# Patient Record
Sex: MALE | Race: BLACK OR AFRICAN AMERICAN | NOT HISPANIC OR LATINO | URBAN - METROPOLITAN AREA
[De-identification: names, ages, dates, MRNs, and addresses within clinical notes are randomized per-mention and may not be internally consistent; named-entity substitution may affect disease eponyms.]

---

## 2019-05-03 ENCOUNTER — INPATIENT (INPATIENT)
Facility: HOSPITAL | Age: 24
LOS: 27 days | Discharge: HOME | End: 2019-05-31
Attending: PSYCHIATRY & NEUROLOGY | Admitting: PSYCHIATRY & NEUROLOGY
Payer: COMMERCIAL

## 2019-05-03 VITALS
RESPIRATION RATE: 16 BRPM | SYSTOLIC BLOOD PRESSURE: 120 MMHG | HEART RATE: 93 BPM | DIASTOLIC BLOOD PRESSURE: 74 MMHG | TEMPERATURE: 98 F | OXYGEN SATURATION: 100 %

## 2019-05-03 DIAGNOSIS — F31.9 BIPOLAR DISORDER, UNSPECIFIED: ICD-10-CM

## 2019-05-03 DIAGNOSIS — F33.2 MAJOR DEPRESSIVE DISORDER, RECURRENT SEVERE WITHOUT PSYCHOTIC FEATURES: ICD-10-CM

## 2019-05-03 DIAGNOSIS — F25.9 SCHIZOAFFECTIVE DISORDER, UNSPECIFIED: ICD-10-CM

## 2019-05-03 LAB
ALBUMIN SERPL ELPH-MCNC: 4.9 G/DL — SIGNIFICANT CHANGE UP (ref 3.3–5)
ALP SERPL-CCNC: 97 U/L — SIGNIFICANT CHANGE UP (ref 40–120)
ALT FLD-CCNC: 14 U/L — SIGNIFICANT CHANGE UP (ref 4–41)
AMPHET UR-MCNC: NEGATIVE — SIGNIFICANT CHANGE UP
ANION GAP SERPL CALC-SCNC: 11 MMO/L — SIGNIFICANT CHANGE UP (ref 7–14)
APAP SERPL-MCNC: < 15 UG/ML — LOW (ref 15–25)
APPEARANCE UR: CLEAR — SIGNIFICANT CHANGE UP
AST SERPL-CCNC: 27 U/L — SIGNIFICANT CHANGE UP (ref 4–40)
BARBITURATES UR SCN-MCNC: NEGATIVE — SIGNIFICANT CHANGE UP
BASOPHILS # BLD AUTO: 0.04 K/UL — SIGNIFICANT CHANGE UP (ref 0–0.2)
BASOPHILS NFR BLD AUTO: 0.4 % — SIGNIFICANT CHANGE UP (ref 0–2)
BENZODIAZ UR-MCNC: NEGATIVE — SIGNIFICANT CHANGE UP
BILIRUB SERPL-MCNC: 0.5 MG/DL — SIGNIFICANT CHANGE UP (ref 0.2–1.2)
BILIRUB UR-MCNC: NEGATIVE — SIGNIFICANT CHANGE UP
BLOOD UR QL VISUAL: NEGATIVE — SIGNIFICANT CHANGE UP
BUN SERPL-MCNC: 20 MG/DL — SIGNIFICANT CHANGE UP (ref 7–23)
CALCIUM SERPL-MCNC: 9.8 MG/DL — SIGNIFICANT CHANGE UP (ref 8.4–10.5)
CANNABINOIDS UR-MCNC: NEGATIVE — SIGNIFICANT CHANGE UP
CHLORIDE SERPL-SCNC: 98 MMOL/L — SIGNIFICANT CHANGE UP (ref 98–107)
CO2 SERPL-SCNC: 28 MMOL/L — SIGNIFICANT CHANGE UP (ref 22–31)
COCAINE METAB.OTHER UR-MCNC: NEGATIVE — SIGNIFICANT CHANGE UP
COLOR SPEC: SIGNIFICANT CHANGE UP
CREAT SERPL-MCNC: 0.99 MG/DL — SIGNIFICANT CHANGE UP (ref 0.5–1.3)
EOSINOPHIL # BLD AUTO: 0.26 K/UL — SIGNIFICANT CHANGE UP (ref 0–0.5)
EOSINOPHIL NFR BLD AUTO: 2.9 % — SIGNIFICANT CHANGE UP (ref 0–6)
ETHANOL BLD-MCNC: < 10 MG/DL — SIGNIFICANT CHANGE UP
GLUCOSE SERPL-MCNC: 89 MG/DL — SIGNIFICANT CHANGE UP (ref 70–99)
GLUCOSE UR-MCNC: NEGATIVE — SIGNIFICANT CHANGE UP
HCT VFR BLD CALC: 48.2 % — SIGNIFICANT CHANGE UP (ref 39–50)
HGB BLD-MCNC: 15.7 G/DL — SIGNIFICANT CHANGE UP (ref 13–17)
HIV COMBO RESULT: SIGNIFICANT CHANGE UP
HIV1+2 AB SPEC QL: SIGNIFICANT CHANGE UP
IMM GRANULOCYTES NFR BLD AUTO: 0.3 % — SIGNIFICANT CHANGE UP (ref 0–1.5)
KETONES UR-MCNC: NEGATIVE — SIGNIFICANT CHANGE UP
LEUKOCYTE ESTERASE UR-ACNC: NEGATIVE — SIGNIFICANT CHANGE UP
LYMPHOCYTES # BLD AUTO: 2.13 K/UL — SIGNIFICANT CHANGE UP (ref 1–3.3)
LYMPHOCYTES # BLD AUTO: 23.7 % — SIGNIFICANT CHANGE UP (ref 13–44)
MAGNESIUM SERPL-MCNC: 2 MG/DL — SIGNIFICANT CHANGE UP (ref 1.6–2.6)
MCHC RBC-ENTMCNC: 30.3 PG — SIGNIFICANT CHANGE UP (ref 27–34)
MCHC RBC-ENTMCNC: 32.6 % — SIGNIFICANT CHANGE UP (ref 32–36)
MCV RBC AUTO: 92.9 FL — SIGNIFICANT CHANGE UP (ref 80–100)
METHADONE UR-MCNC: NEGATIVE — SIGNIFICANT CHANGE UP
MONOCYTES # BLD AUTO: 1.27 K/UL — HIGH (ref 0–0.9)
MONOCYTES NFR BLD AUTO: 14.1 % — HIGH (ref 2–14)
NEUTROPHILS # BLD AUTO: 5.27 K/UL — SIGNIFICANT CHANGE UP (ref 1.8–7.4)
NEUTROPHILS NFR BLD AUTO: 58.6 % — SIGNIFICANT CHANGE UP (ref 43–77)
NITRITE UR-MCNC: NEGATIVE — SIGNIFICANT CHANGE UP
NRBC # FLD: 0 K/UL — SIGNIFICANT CHANGE UP (ref 0–0)
OPIATES UR-MCNC: NEGATIVE — SIGNIFICANT CHANGE UP
OXYCODONE UR-MCNC: NEGATIVE — SIGNIFICANT CHANGE UP
PCP UR-MCNC: NEGATIVE — SIGNIFICANT CHANGE UP
PH UR: 6.5 — SIGNIFICANT CHANGE UP (ref 5–8)
PLATELET # BLD AUTO: 222 K/UL — SIGNIFICANT CHANGE UP (ref 150–400)
PMV BLD: 10 FL — SIGNIFICANT CHANGE UP (ref 7–13)
POTASSIUM SERPL-MCNC: 4.2 MMOL/L — SIGNIFICANT CHANGE UP (ref 3.5–5.3)
POTASSIUM SERPL-SCNC: 4.2 MMOL/L — SIGNIFICANT CHANGE UP (ref 3.5–5.3)
PROT SERPL-MCNC: 8.4 G/DL — HIGH (ref 6–8.3)
PROT UR-MCNC: NEGATIVE — SIGNIFICANT CHANGE UP
RBC # BLD: 5.19 M/UL — SIGNIFICANT CHANGE UP (ref 4.2–5.8)
RBC # FLD: 13.9 % — SIGNIFICANT CHANGE UP (ref 10.3–14.5)
SALICYLATES SERPL-MCNC: < 5 MG/DL — LOW (ref 15–30)
SODIUM SERPL-SCNC: 137 MMOL/L — SIGNIFICANT CHANGE UP (ref 135–145)
SP GR SPEC: 1.02 — SIGNIFICANT CHANGE UP (ref 1–1.04)
TSH SERPL-MCNC: 1.58 UIU/ML — SIGNIFICANT CHANGE UP (ref 0.27–4.2)
UROBILINOGEN FLD QL: NORMAL — SIGNIFICANT CHANGE UP
WBC # BLD: 9 K/UL — SIGNIFICANT CHANGE UP (ref 3.8–10.5)
WBC # FLD AUTO: 9 K/UL — SIGNIFICANT CHANGE UP (ref 3.8–10.5)

## 2019-05-03 PROCEDURE — 99285 EMERGENCY DEPT VISIT HI MDM: CPT | Mod: GC

## 2019-05-03 RX ORDER — QUETIAPINE FUMARATE 200 MG/1
200 TABLET, FILM COATED ORAL AT BEDTIME
Qty: 0 | Refills: 0 | Status: DISCONTINUED | OUTPATIENT
Start: 2019-05-03 | End: 2019-05-03

## 2019-05-03 RX ORDER — DIPHENHYDRAMINE HCL 50 MG
50 CAPSULE ORAL ONCE
Qty: 0 | Refills: 0 | Status: COMPLETED | OUTPATIENT
Start: 2019-05-03 | End: 2019-05-03

## 2019-05-03 RX ORDER — DIVALPROEX SODIUM 500 MG/1
2000 TABLET, DELAYED RELEASE ORAL AT BEDTIME
Qty: 0 | Refills: 0 | Status: DISCONTINUED | OUTPATIENT
Start: 2019-05-03 | End: 2019-05-03

## 2019-05-03 RX ORDER — QUETIAPINE FUMARATE 200 MG/1
25 TABLET, FILM COATED ORAL EVERY 6 HOURS
Qty: 0 | Refills: 0 | Status: DISCONTINUED | OUTPATIENT
Start: 2019-05-03 | End: 2019-05-06

## 2019-05-03 RX ORDER — DIVALPROEX SODIUM 500 MG/1
1500 TABLET, DELAYED RELEASE ORAL
Qty: 0 | Refills: 0 | Status: DISCONTINUED | OUTPATIENT
Start: 2019-05-03 | End: 2019-05-31

## 2019-05-03 RX ORDER — QUETIAPINE FUMARATE 200 MG/1
250 TABLET, FILM COATED ORAL AT BEDTIME
Qty: 0 | Refills: 0 | Status: DISCONTINUED | OUTPATIENT
Start: 2019-05-03 | End: 2019-05-06

## 2019-05-03 RX ORDER — DIVALPROEX SODIUM 500 MG/1
1500 TABLET, DELAYED RELEASE ORAL DAILY
Qty: 0 | Refills: 0 | Status: DISCONTINUED | OUTPATIENT
Start: 2019-05-03 | End: 2019-05-03

## 2019-05-03 RX ADMIN — DIVALPROEX SODIUM 1500 MILLIGRAM(S): 500 TABLET, DELAYED RELEASE ORAL at 21:55

## 2019-05-03 RX ADMIN — Medication 2 MILLIGRAM(S): at 12:26

## 2019-05-03 RX ADMIN — QUETIAPINE FUMARATE 25 MILLIGRAM(S): 200 TABLET, FILM COATED ORAL at 18:13

## 2019-05-03 RX ADMIN — Medication 2 MILLIGRAM(S): at 18:13

## 2019-05-03 RX ADMIN — QUETIAPINE FUMARATE 250 MILLIGRAM(S): 200 TABLET, FILM COATED ORAL at 21:55

## 2019-05-03 RX ADMIN — Medication 50 MILLIGRAM(S): at 12:26

## 2019-05-03 RX ADMIN — DIVALPROEX SODIUM 1500 MILLIGRAM(S): 500 TABLET, DELAYED RELEASE ORAL at 15:42

## 2019-05-03 NOTE — ED BEHAVIORAL HEALTH ASSESSMENT NOTE - DETAILS
[facility] n/a unable to contact patient's family contacted PHP provider above reports "allergies" to haldol, zyprexa, and prolixin - dystonic reaction/EPS reports "allergies" to haldol, zyprexa, and prolixin - dystonic reaction/EPS, Risperdal - concern for NMS, zyprexa - elevated CK/concern for NMS handoff to Dr. Mendelowitz 2N

## 2019-05-03 NOTE — ED BEHAVIORAL HEALTH ASSESSMENT NOTE - PSYCHIATRIC ISSUES AND PLAN (INCLUDE STANDING AND PRN MEDICATION)
Continue depakote 1500 qAM + 2000 qhs, seroquel 200mg qhs. Consider need for further med titration and would favor medication available in OLIVIA for compliance concerns. PRN seroquel/ativan PO, PRN ativan IM for agitation.

## 2019-05-03 NOTE — ED BEHAVIORAL HEALTH ASSESSMENT NOTE - SUMMARY
Patient is a 24yo [place]-American man, single, unemployed, noncaregiver, domiciled with step-father who is currently out of the country, with PPHx of documented Schizoaffective, bipolar type, at least 4 past hospitalizations last at [facility] from March 30 - April 30 discharged to [facility] for the last few days on Depakote and Seroquel, no known hx of suicidality or violence, although expresses rage/aggression towards family members, denies active substance use, no relevant PMHx who presents to the ED BIBA activated by his cousins after being up all night and appearing restless and manic.     Patient presents as manic with elevated mood, decreased need for sleep, increased energy, distractibility, increased goal directed activity, grandiosity, flight of ideas, and pressured speech. Patient was recently discharged from hospital and has been in partial program for the last few days but has presented very unstable and concern that he may have been non-compliant with medications. Although patient denies SI/HI/I/P, he has expressed recent rage and threats of violence towards his uncle in the context of his manic episode and has been not sleeping and not eating. He currently represents a danger to himself and others and requires inpatient psychiatric hospitalization for further stabilization and safety for ongoing symptoms of partially treated manic episode. Patient is a 22yo Northern Irish-American man, single, unemployed, noncaregiver, domiciled with step-father who is currently out of the country, with PPHx of documented Schizoaffective, bipolar type, at least 4 past hospitalizations last at [facility] from March 30 - April 30 discharged to [facility] for the last few days on Depakote and Seroquel, no known hx of suicidality or violence, although expresses rage/aggression towards family members, denies active substance use, no relevant PMHx who presents to the ED BIBA activated by his cousins after being up all night and appearing restless and manic.     Patient presents as manic with elevated mood, decreased need for sleep, increased energy, distractibility, increased goal directed activity, grandiosity, flight of ideas, and pressured speech. Patient was recently discharged from hospital and has been in partial program for the last few days but has presented very unstable and concern that he may have been non-compliant with medications. Although patient denies SI/HI/I/P, he has expressed recent rage and threats of violence towards his uncle in the context of his manic episode and has been not sleeping and not eating. He currently represents a danger to himself and others and requires inpatient psychiatric hospitalization for further stabilization and safety for ongoing symptoms of partially treated manic episode. Patient has had dystonic reactions and concern for NMS with multiple past antipsychotics (see note), but would likely benefit from OLIVIA if able to tolerate.

## 2019-05-03 NOTE — ED PROVIDER NOTE - PHYSICAL EXAMINATION
PHYSICAL EXAM:  Vital signs reviewed.  GENERAL: Patient is awake and alert and in no acute distress.  Non-toxic appearing.  A+Ox4  HEAD:  Airway patent.  No oropharyngeal edema.  No stridor.  Auricles are normal.    EYES: EOM grossly intact, conjunctiva non-injected and sclera clear  NECK: Supple, No vertebral point tenderness to palpation.  CHEST/LUNG: Lungs clear to auscultation bilaterally; no wheeze, no rhonchi,  no rales.    HEART: Regular rate and rhythm;   ABDOMEN: Soft, non-tender to palpation.  No rebound/no guarding.  Bowel sounds present x 4.   MSK/EXTREMITIES: No clubbing or cyanosis. Back is nontender, with no vertebral point tenderness to palpation, no CVAT.  Moving all 4 extremities.     NEURO: Neurologically grossly intact.   No obvious deficits.   PSYCH: Psychiatrically normal mood and affect.  No apparent risk to self or others.       DR. HERNANDEZ, ATTENDING MD:    I performed a face to face bedside interview with patient regarding history of present illness, review of symptoms and past medical history. I completed an independent physical exam.  I have discussed patient's plan of care with the team of health care providers.   I agree with note as stated above, having amended the EMR as needed to reflect my findings. I have discussed the assessment and plan of care.  This includes during the time I functioned as the attending physician for this patient.

## 2019-05-03 NOTE — ED ADULT NURSE REASSESSMENT NOTE - NS ED NURSE REASSESS COMMENT FT1
After finding out he was being admitted pt became agitated, yelling, cursing, deescalation attempted unsuccessfully, pt medicated as per orders.

## 2019-05-03 NOTE — ED BEHAVIORAL HEALTH ASSESSMENT NOTE - OTHER PAST PSYCHIATRIC HISTORY (INCLUDE DETAILS REGARDING ONSET, COURSE OF ILLNESS, INPATIENT/OUTPATIENT TREATMENT)
At least 4 prior hospitalizations  first at [facility] 2014  last at [facility] 3/2019-4/2019

## 2019-05-03 NOTE — ED PROVIDER NOTE - CARE PLAN
Principal Discharge DX:	Schizoaffective disorder  Secondary Diagnosis:	Bipolar affective disorder, current episode manic, current episode severity unspecified

## 2019-05-03 NOTE — ED PROVIDER NOTE - CLINICAL SUMMARY MEDICAL DECISION MAKING FREE TEXT BOX
Patient triaged directly to behavioral health area for psychiatric evaluation. Labs, PO hydration, reassess.

## 2019-05-03 NOTE — ED BEHAVIORAL HEALTH ASSESSMENT NOTE - CASE SUMMARY
Patient is a 24yo Latvian-American man, single, unemployed, noncaregiver, domiciled with step-father who is currently out of the country, with PPHx of documented Schizoaffective, bipolar type, at least 4 past hospitalizations last at [facility] from March 30 - April 30 discharged to [facility] for the last few days on Depakote and Seroquel, no known hx of suicidality or violence, although expresses rage/aggression towards family members, denies active substance use, no relevant PMHx who presents to the ED BIBA activated by his cousins after being up all night and appearing restless and manic.     Patient presents as manic with elevated mood, decreased need for sleep, increased energy, distractibility, increased goal directed activity, grandiosity, flight of ideas, and pressured speech. Patient was recently discharged from hospital and has been in partial program for the last few days but has presented very unstable and concern that he may have been non-compliant with medications. Although patient denies SI/HI/I/P, he has expressed recent rage and threats of violence towards his uncle in the context of his manic episode and has been not sleeping and not eating. He currently represents a danger to himself and others and requires inpatient psychiatric hospitalization for further stabilization and safety for ongoing symptoms of partially treated manic episode.

## 2019-05-03 NOTE — ED BEHAVIORAL HEALTH ASSESSMENT NOTE - RISK ASSESSMENT
Patient is at elevated risk of harm to self and others. Chronic risk factors include diagnosis of schizoaffective, bipolar, hx of prior hospitalizations, hx of quick decompensation, male gender. Acute risk factors include current manic symptoms, global insomnia, threats of violence towards family, little insight into need for additional treatment. Patient has decompensated despite high level of care at partial hospital program and requires inpatient psychiatric hospitalization for further stabilization and safety for ongoing symptoms of partially treated manic episode.

## 2019-05-03 NOTE — ED ADULT TRIAGE NOTE - CHIEF COMPLAINT QUOTE
Arrives with ems from home, h/o bipolar disorder and schizophrenia , family called ems because he was restless and acting out. Denies any drugs or etoh use.

## 2019-05-03 NOTE — ED BEHAVIORAL HEALTH ASSESSMENT NOTE - DESCRIPTION
cooperative with interview, pacing and restless at times    Vital Signs Last 24 Hrs  T(C): 36.9 (03 May 2019 08:54), Max: 36.9 (03 May 2019 08:54)  T(F): 98.5 (03 May 2019 08:54), Max: 98.5 (03 May 2019 08:54)  HR: 93 (03 May 2019 08:54) (93 - 93)  BP: 120/74 (03 May 2019 08:54) (120/74 - 120/74)  BP(mean): --  RR: 16 (03 May 2019 08:54) (16 - 16)  SpO2: 100% (03 May 2019 08:54) (100% - 100%) none see HPI

## 2019-05-03 NOTE — ED BEHAVIORAL HEALTH ASSESSMENT NOTE - HPI (INCLUDE ILLNESS QUALITY, SEVERITY, DURATION, TIMING, CONTEXT, MODIFYING FACTORS, ASSOCIATED SIGNS AND SYMPTOMS)
Patient is a 22yo Cuban-American man, single, unemployed, noncaregiver, domiciled with step-father who is currently out of the country, with PPHx of documented Schizoaffective, bipolar type, at least 4 past hospitalizations last at [facility] from March 30 - April 30 discharged to [facility] Program for the last few days on Depakote and Seroquel, no known hx of suicidality or violence, although expresses rage/aggression towards family members, denies active substance use, no relevant PMHx who presents to the ED BIBA activated by his cousins after being up all night and appearing restless and manic.     On evaluation, patient is AOx3, tangential with flight of ideas, pressured speech, difficulty sitting still during interview, choosing to stand intermittently. Patient is difficult to follow, requiring frequent interruptions and redirection to establish events leading up to presentation to the ED and gather symptoms. Per patient, he is currently living in [place] with his step-father who is out of town, and he is being looked after by his brother and uncle. 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 He states that in the middle of the night, he asked his brother for his passport and he refused to give to him, so patient went to the police station to demand his identification. He reports that police contacted his brother and released him with plan for him to go to [facility] that day, instead patient took the subway to his cousin's house and in the early morning hours demanded to speak with his cousin who is a  in order to continue to pursue this new project. Per patient, his cousin's felt that he appeared manic and having a "mental breakdown" and called 911.     Patient reports mood is "great", endorses not sleeping since leaving the hospital, exhibits pressured speech, flight of ideas, increased goal directed activity - reports spending 10hr per day working on new projects as a "", endorses being easily distracted by ambient noises and conversations outside the room, displays grandiosity with respect to his abilities as a  and his many projects/abilities to network considering that he is in fact unemployed, denies behaviors with adverse consequences. Denies AVH, paranoia, or delusions. Denies SI/HI/I/P, denies being aggressive or violent with family members on this occasion. Reports an extensive and elaborate organizational system to keep track of various things, endorses being fixated on order and has difficulty when his home is in disarray, denies OCD symptoms. Denies hx of psychotic symptoms, but per documentation carries schizoaffective diagnosis. Reports recent changes to medications, but does not know what meds he takes other than depakote. Reports compliance with all meds.     Collateral obtained from patient's primary therapist Joanne Lehman at [facility] reports that patient has appeared very manic, grandiose and unstable since discharge from the hospital and that the staff has been worried about him, not surprised that he is in the ED. Reports that patient has also been expressing rage and threats towards his uncle. They are suspicious that patient may be noncompliant with meds although he denies. They report that patient is well known to [facility] and [facility] and that at baseline patient has hypomanic symptoms. Provided contact information for step-dad who is out of the country and was not available and patient's Health Home Coordinator Agnieszka [phone]. Confirmed that patient is on depakote 1500mg qAM + 2000mg qhs and Seroquel 200mg qhs.

## 2019-05-03 NOTE — ED PROVIDER NOTE - PROGRESS NOTE DETAILS
JULI JONES MD: D/W psychiatry and awaiting medical clearance for admission. JULI JONES, MD: Attending performed HPI, past medical history, Social hx, ROS, PE and MDM.  Residents, PAs, and/or NPs  may be involved in case for assistance with disposition or document as necessary via progress note(s).

## 2019-05-03 NOTE — ED BEHAVIORAL HEALTH ASSESSMENT NOTE - ACCOMPANIED BY
Note Text (......Xxx Chief Complaint.): This diagnosis correlates with the Detail Level: Detailed Other (Free Text): Patient to consider on having biopsy in the future. Self

## 2019-05-03 NOTE — ED ADULT NURSE NOTE - OBJECTIVE STATEMENT
Pt received a&ox3, as per EMS pt brought in after family called stating pt was "restless", pt w recent psychiatric hospitalization at [facility] for 1 month, pt w c/o "racing thoughts", endorses difficulty sleeping since prior to hospitalization, pt denies any SI/HI/Drug use/Drinking/Hallucinations/Delusions, pt calm and cooperative, NAD, will continue to monitor.

## 2019-05-03 NOTE — ED BEHAVIORAL HEALTH ASSESSMENT NOTE - PAST PSYCHOTROPIC MEDICATION
haldol, zyprexa, prolixin Risperdal - concern for NMS, Haldol, zyprexa - elevated CK/concern for NMS  thorazine - elevated CK/concern for NMS, Lithium, Clozapine - only able to titrate to 50mg TID due to tachycardia, prolixin

## 2019-05-03 NOTE — ED PROVIDER NOTE - OBJECTIVE STATEMENT
24 yo F brought in by EMS after family called secondary to agitation, insomnia and decreased eating and drinking.  Patient states he's had difficulty sleeping and decreased appetite.  Patient states past medical history of bipolar and schizoaffective d/o.

## 2019-05-04 LAB
BACTERIA UR CULT: SIGNIFICANT CHANGE UP
CHOLEST SERPL-MCNC: 142 MG/DL — SIGNIFICANT CHANGE UP (ref 120–199)
HBA1C BLD-MCNC: 5.4 % — SIGNIFICANT CHANGE UP (ref 4–5.6)
HDLC SERPL-MCNC: 54 MG/DL — SIGNIFICANT CHANGE UP (ref 35–55)
LIPID PNL WITH DIRECT LDL SERPL: 84 MG/DL — SIGNIFICANT CHANGE UP
SPECIMEN SOURCE: SIGNIFICANT CHANGE UP
TRIGL SERPL-MCNC: 52 MG/DL — SIGNIFICANT CHANGE UP (ref 10–149)
VALPROATE SERPL-MCNC: 96.9 UG/ML — SIGNIFICANT CHANGE UP (ref 50–100)

## 2019-05-04 PROCEDURE — 99222 1ST HOSP IP/OBS MODERATE 55: CPT

## 2019-05-04 RX ADMIN — Medication 2 MILLIGRAM(S): at 08:09

## 2019-05-04 RX ADMIN — DIVALPROEX SODIUM 1500 MILLIGRAM(S): 500 TABLET, DELAYED RELEASE ORAL at 21:11

## 2019-05-04 RX ADMIN — QUETIAPINE FUMARATE 250 MILLIGRAM(S): 200 TABLET, FILM COATED ORAL at 21:11

## 2019-05-04 RX ADMIN — QUETIAPINE FUMARATE 25 MILLIGRAM(S): 200 TABLET, FILM COATED ORAL at 08:07

## 2019-05-04 RX ADMIN — DIVALPROEX SODIUM 1500 MILLIGRAM(S): 500 TABLET, DELAYED RELEASE ORAL at 08:07

## 2019-05-05 PROCEDURE — 99232 SBSQ HOSP IP/OBS MODERATE 35: CPT

## 2019-05-05 RX ADMIN — QUETIAPINE FUMARATE 25 MILLIGRAM(S): 200 TABLET, FILM COATED ORAL at 07:20

## 2019-05-05 RX ADMIN — Medication 2 MILLIGRAM(S): at 07:20

## 2019-05-05 RX ADMIN — DIVALPROEX SODIUM 1500 MILLIGRAM(S): 500 TABLET, DELAYED RELEASE ORAL at 20:05

## 2019-05-05 RX ADMIN — DIVALPROEX SODIUM 1500 MILLIGRAM(S): 500 TABLET, DELAYED RELEASE ORAL at 09:35

## 2019-05-05 RX ADMIN — QUETIAPINE FUMARATE 250 MILLIGRAM(S): 200 TABLET, FILM COATED ORAL at 20:05

## 2019-05-06 PROCEDURE — 99232 SBSQ HOSP IP/OBS MODERATE 35: CPT

## 2019-05-06 RX ORDER — DIPHENHYDRAMINE HCL 50 MG
50 CAPSULE ORAL ONCE
Qty: 0 | Refills: 0 | Status: COMPLETED | OUTPATIENT
Start: 2019-05-06 | End: 2019-05-06

## 2019-05-06 RX ORDER — OLANZAPINE 15 MG/1
10 TABLET, FILM COATED ORAL ONCE
Qty: 0 | Refills: 0 | Status: DISCONTINUED | OUTPATIENT
Start: 2019-05-06 | End: 2019-05-07

## 2019-05-06 RX ORDER — OLANZAPINE 15 MG/1
10 TABLET, FILM COATED ORAL EVERY 6 HOURS
Qty: 0 | Refills: 0 | Status: DISCONTINUED | OUTPATIENT
Start: 2019-05-06 | End: 2019-05-07

## 2019-05-06 RX ORDER — DIPHENHYDRAMINE HCL 50 MG
50 CAPSULE ORAL ONCE
Qty: 0 | Refills: 0 | Status: DISCONTINUED | OUTPATIENT
Start: 2019-05-06 | End: 2019-05-06

## 2019-05-06 RX ORDER — OLANZAPINE 15 MG/1
10 TABLET, FILM COATED ORAL AT BEDTIME
Qty: 0 | Refills: 0 | Status: DISCONTINUED | OUTPATIENT
Start: 2019-05-06 | End: 2019-05-07

## 2019-05-06 RX ORDER — HALOPERIDOL DECANOATE 100 MG/ML
7.5 INJECTION INTRAMUSCULAR ONCE
Qty: 0 | Refills: 0 | Status: COMPLETED | OUTPATIENT
Start: 2019-05-06 | End: 2019-05-06

## 2019-05-06 RX ORDER — HALOPERIDOL DECANOATE 100 MG/ML
7.5 INJECTION INTRAMUSCULAR ONCE
Qty: 0 | Refills: 0 | Status: DISCONTINUED | OUTPATIENT
Start: 2019-05-06 | End: 2019-05-06

## 2019-05-06 RX ADMIN — HALOPERIDOL DECANOATE 7.5 MILLIGRAM(S): 100 INJECTION INTRAMUSCULAR at 16:08

## 2019-05-06 RX ADMIN — DIVALPROEX SODIUM 1500 MILLIGRAM(S): 500 TABLET, DELAYED RELEASE ORAL at 21:21

## 2019-05-06 RX ADMIN — QUETIAPINE FUMARATE 25 MILLIGRAM(S): 200 TABLET, FILM COATED ORAL at 07:12

## 2019-05-06 RX ADMIN — Medication 50 MILLIGRAM(S): at 16:08

## 2019-05-06 RX ADMIN — Medication 2 MILLIGRAM(S): at 07:12

## 2019-05-06 RX ADMIN — DIVALPROEX SODIUM 1500 MILLIGRAM(S): 500 TABLET, DELAYED RELEASE ORAL at 08:53

## 2019-05-06 RX ADMIN — OLANZAPINE 10 MILLIGRAM(S): 15 TABLET, FILM COATED ORAL at 21:21

## 2019-05-06 RX ADMIN — Medication 3 MILLIGRAM(S): at 16:08

## 2019-05-07 LAB — CK SERPL-CCNC: 3054 U/L — HIGH (ref 30–200)

## 2019-05-07 PROCEDURE — 99232 SBSQ HOSP IP/OBS MODERATE 35: CPT | Mod: GC

## 2019-05-07 RX ORDER — DIPHENHYDRAMINE HCL 50 MG
50 CAPSULE ORAL ONCE
Qty: 0 | Refills: 0 | Status: COMPLETED | OUTPATIENT
Start: 2019-05-07 | End: 2019-05-07

## 2019-05-07 RX ORDER — CLONAZEPAM 1 MG
1 TABLET ORAL
Qty: 0 | Refills: 0 | Status: DISCONTINUED | OUTPATIENT
Start: 2019-05-07 | End: 2019-05-09

## 2019-05-07 RX ORDER — DIPHENHYDRAMINE HCL 50 MG
50 CAPSULE ORAL ONCE
Qty: 0 | Refills: 0 | Status: DISCONTINUED | OUTPATIENT
Start: 2019-05-07 | End: 2019-05-31

## 2019-05-07 RX ORDER — OLANZAPINE 15 MG/1
10 TABLET, FILM COATED ORAL DAILY
Qty: 0 | Refills: 0 | Status: DISCONTINUED | OUTPATIENT
Start: 2019-05-07 | End: 2019-05-31

## 2019-05-07 RX ORDER — OLANZAPINE 15 MG/1
10 TABLET, FILM COATED ORAL
Qty: 0 | Refills: 0 | Status: DISCONTINUED | OUTPATIENT
Start: 2019-05-07 | End: 2019-05-13

## 2019-05-07 RX ORDER — DIPHENHYDRAMINE HCL 50 MG
50 CAPSULE ORAL EVERY 6 HOURS
Qty: 0 | Refills: 0 | Status: DISCONTINUED | OUTPATIENT
Start: 2019-05-07 | End: 2019-05-31

## 2019-05-07 RX ADMIN — DIVALPROEX SODIUM 1500 MILLIGRAM(S): 500 TABLET, DELAYED RELEASE ORAL at 08:03

## 2019-05-07 RX ADMIN — Medication 3 MILLIGRAM(S): at 08:17

## 2019-05-07 RX ADMIN — Medication 2 MILLIGRAM(S): at 07:57

## 2019-05-07 RX ADMIN — OLANZAPINE 10 MILLIGRAM(S): 15 TABLET, FILM COATED ORAL at 07:57

## 2019-05-07 RX ADMIN — Medication 50 MILLIGRAM(S): at 08:17

## 2019-05-07 RX ADMIN — Medication 1 MILLIGRAM(S): at 20:08

## 2019-05-07 RX ADMIN — OLANZAPINE 10 MILLIGRAM(S): 15 TABLET, FILM COATED ORAL at 20:08

## 2019-05-07 RX ADMIN — DIVALPROEX SODIUM 1500 MILLIGRAM(S): 500 TABLET, DELAYED RELEASE ORAL at 20:08

## 2019-05-08 LAB
ANION GAP SERPL CALC-SCNC: 14 MMO/L — SIGNIFICANT CHANGE UP (ref 7–14)
BUN SERPL-MCNC: 18 MG/DL — SIGNIFICANT CHANGE UP (ref 7–23)
CALCIUM SERPL-MCNC: 9.3 MG/DL — SIGNIFICANT CHANGE UP (ref 8.4–10.5)
CHLORIDE SERPL-SCNC: 101 MMOL/L — SIGNIFICANT CHANGE UP (ref 98–107)
CK SERPL-CCNC: 2932 U/L — HIGH (ref 30–200)
CO2 SERPL-SCNC: 23 MMOL/L — SIGNIFICANT CHANGE UP (ref 22–31)
CREAT SERPL-MCNC: 0.92 MG/DL — SIGNIFICANT CHANGE UP (ref 0.5–1.3)
GLUCOSE SERPL-MCNC: 123 MG/DL — HIGH (ref 70–99)
POTASSIUM SERPL-MCNC: 3.9 MMOL/L — SIGNIFICANT CHANGE UP (ref 3.5–5.3)
POTASSIUM SERPL-SCNC: 3.9 MMOL/L — SIGNIFICANT CHANGE UP (ref 3.5–5.3)
SODIUM SERPL-SCNC: 138 MMOL/L — SIGNIFICANT CHANGE UP (ref 135–145)

## 2019-05-08 PROCEDURE — 99232 SBSQ HOSP IP/OBS MODERATE 35: CPT | Mod: GC

## 2019-05-08 PROCEDURE — 99223 1ST HOSP IP/OBS HIGH 75: CPT | Mod: AI

## 2019-05-08 RX ADMIN — Medication 1 MILLIGRAM(S): at 08:08

## 2019-05-08 RX ADMIN — DIVALPROEX SODIUM 1500 MILLIGRAM(S): 500 TABLET, DELAYED RELEASE ORAL at 08:08

## 2019-05-08 RX ADMIN — OLANZAPINE 10 MILLIGRAM(S): 15 TABLET, FILM COATED ORAL at 08:08

## 2019-05-08 RX ADMIN — DIVALPROEX SODIUM 1500 MILLIGRAM(S): 500 TABLET, DELAYED RELEASE ORAL at 20:17

## 2019-05-08 RX ADMIN — Medication 1 DROP(S): at 20:17

## 2019-05-08 RX ADMIN — OLANZAPINE 10 MILLIGRAM(S): 15 TABLET, FILM COATED ORAL at 20:17

## 2019-05-08 RX ADMIN — Medication 1 MILLIGRAM(S): at 20:17

## 2019-05-08 NOTE — CONSULT NOTE ADULT - SUBJECTIVE AND OBJECTIVE BOX
HPI: 23y/M  PMH of Schizoaffective, bipolar type, previous  hospitalizations [facility] with psychiatric decompenstion   Medicine consulted for elevated CK  As per psych resident, pt has been agitated past few days and was getting IM haldol/Im bendarly.CK was checked yesterday and was noted to be 3054. Repeat this AM was 2932  Pt denies muscle cramps, soreness or pain   Ambulating in the hallway without distress  Denies CP/SOB or new complaints  Pt states he drinks 6-8 glasses of water and was told today that he need to  drink more  No change in the color of his urine- no reddish or brown tinge noted   Redness in both eyes - states he usally gets them every spring from allergies, uses eye drops at home   No rashes or redness elsewhere       PAST MEDICAL & SURGICAL HISTORY:  Schizoaffective  Seasonal allergies       Review of Systems:   CONSTITUTIONAL: No fever, weight loss, or fatigue  EYES: Eye redness  ENMT:  No difficulty hearing, tinnitus, vertigo; No sinus or throat pain  NECK: No pain or stiffness  RESPIRATORY: No cough, wheezing, chills or hemoptysis; No shortness of breath  CARDIOVASCULAR: No chest pain, palpitations, dizziness, or leg swelling  GASTROINTESTINAL: No abdominal or epigastric pain. No nausea, vomiting, or hematemesis; No diarrhea or constipation. No melena or hematochezia.Last BM 2 days back   GENITOURINARY: No dysuria, frequency, hematuria, or incontinence  NEUROLOGICAL: No headaches, memory loss, loss of strength, numbness, or tremors  SKIN: No itching, burning, rashes, or lesions   LYMPH NODES: No enlarged glands  ENDOCRINE: No heat or cold intolerance; No hair loss  MUSCULOSKELETAL: No joint pain or swelling; No muscle, back, or extremity pain  HEME/LYMPH: No easy bruising, or bleeding gums  ALLERY AND IMMUNOLOGIC: No hives or eczema    Allergies    No Known Allergies    Intolerances        Social History:  Denies smoking, occ consumes ETOH    FAMILY HISTORY: States his parents are healthy but some of his cousins have DM       MEDICATIONS  (STANDING):  clonazePAM  Tablet 1 milliGRAM(s) Oral two times a day  diVALproex ER 1500 milliGRAM(s) Oral <User Schedule>  OLANZapine 10 milliGRAM(s) Oral two times a day    MEDICATIONS  (PRN):  diphenhydrAMINE 50 milliGRAM(s) Oral every 6 hours PRN agitation  diphenhydrAMINE   Injectable 50 milliGRAM(s) IntraMuscular once PRN severe agitation  LORazepam     Tablet 3 milliGRAM(s) Oral every 6 hours PRN anxiety/agitation  LORazepam   Injectable 3 milliGRAM(s) IntraMuscular once PRN severe agitation  LORazepam   Injectable 2 milliGRAM(s) IntraMuscular once PRN severe agitation  OLANZapine 10 milliGRAM(s) Oral daily PRN severe agitation      Vital Signs Last 24 Hrs  T(C): 36.5 (08 May 2019 07:56), Max: 36.5 (08 May 2019 07:56)  T(F): 97.7 (08 May 2019 07:56), Max: 97.7 (08 May 2019 07:56)  HR: --  BP: --  BP(mean): --  RR: --  SpO2: --  CAPILLARY BLOOD GLUCOSE            PHYSICAL EXAM:  GENERAL: NAD, well-developed  HEAD:  Atraumatic, Normocephalic  EYES: EOMI, conjunctival erythema, no discharge or exudates vision grossly  intact  NECK: Supple, No JVD  CHEST/LUNG: Clear to auscultation bilaterally; No wheeze  HEART: Regular rate and rhythm; No murmurs, rubs, or gallops  ABDOMEN: Soft, Nontender, Nondistended; Bowel sounds present  EXTREMITIES:  2+ Peripheral Pulses, No clubbing, cyanosis, or edema  NEUROLOGY: non-focal  SKIN: No rashes or lesions    LABS:    05-08    138  |  101  |  18  ----------------------------<  123<H>  3.9   |  23  |  0.92    Ca    9.3      08 May 2019 08:28        CARDIAC MARKERS ( 08 May 2019 08:28 )  x     / x     / 2932 u/L / x     / x      CARDIAC MARKERS ( 07 May 2019 13:10 )  x     / x     / 3054 u/L / x     / x              EKG(personally reviewed): NSR, QTC of 406 ms     RADIOLOGY & ADDITIONAL TESTS:    Imaging Personally Reviewed:    Consultant(s) Notes Reviewed:      Care Discussed with Consultants/Other Providers: Psych resident Dr Rasmussen - Advise to add BMP to monitor kidney function

## 2019-05-08 NOTE — CONSULT NOTE ADULT - ASSESSMENT
23y/M  PMH of Schizoaffective, bipolar type, previous  hospitalizations [facility] with psychiatric decompenstion   Medicine consulted for elevated CK    # Rhabdomyolysis with elevation in CK- in the setting of IM injections  clinically no muscle symptoms or soreness  Encourage PO , avoid IM injections  Creatinine- 0.92   Monitor  BMP - if pt develops ELIANE  , will need IV hydration  Monitor CK     # Seasonal allergies-with conjunctival redness- will prescribe artificial tears     # H/o  Schizoaffective, bipolar type with episodes of agitation- Management as per psych

## 2019-05-09 LAB
ANION GAP SERPL CALC-SCNC: 12 MMO/L — SIGNIFICANT CHANGE UP (ref 7–14)
BUN SERPL-MCNC: 18 MG/DL — SIGNIFICANT CHANGE UP (ref 7–23)
CALCIUM SERPL-MCNC: 9.5 MG/DL — SIGNIFICANT CHANGE UP (ref 8.4–10.5)
CHLORIDE SERPL-SCNC: 98 MMOL/L — SIGNIFICANT CHANGE UP (ref 98–107)
CK SERPL-CCNC: 1464 U/L — HIGH (ref 30–200)
CO2 SERPL-SCNC: 28 MMOL/L — SIGNIFICANT CHANGE UP (ref 22–31)
CREAT SERPL-MCNC: 0.97 MG/DL — SIGNIFICANT CHANGE UP (ref 0.5–1.3)
GLUCOSE SERPL-MCNC: 88 MG/DL — SIGNIFICANT CHANGE UP (ref 70–99)
POTASSIUM SERPL-MCNC: 4.1 MMOL/L — SIGNIFICANT CHANGE UP (ref 3.5–5.3)
POTASSIUM SERPL-SCNC: 4.1 MMOL/L — SIGNIFICANT CHANGE UP (ref 3.5–5.3)
SODIUM SERPL-SCNC: 138 MMOL/L — SIGNIFICANT CHANGE UP (ref 135–145)

## 2019-05-09 PROCEDURE — 99232 SBSQ HOSP IP/OBS MODERATE 35: CPT | Mod: GC

## 2019-05-09 RX ORDER — CLONAZEPAM 1 MG
1 TABLET ORAL
Refills: 0 | Status: DISCONTINUED | OUTPATIENT
Start: 2019-05-09 | End: 2019-05-16

## 2019-05-09 RX ADMIN — Medication 1 MILLIGRAM(S): at 08:11

## 2019-05-09 RX ADMIN — DIVALPROEX SODIUM 1500 MILLIGRAM(S): 500 TABLET, DELAYED RELEASE ORAL at 20:56

## 2019-05-09 RX ADMIN — OLANZAPINE 10 MILLIGRAM(S): 15 TABLET, FILM COATED ORAL at 08:11

## 2019-05-09 RX ADMIN — OLANZAPINE 10 MILLIGRAM(S): 15 TABLET, FILM COATED ORAL at 20:56

## 2019-05-09 RX ADMIN — Medication 1 DROP(S): at 13:04

## 2019-05-09 RX ADMIN — Medication 1 DROP(S): at 20:56

## 2019-05-09 RX ADMIN — Medication 1 MILLIGRAM(S): at 21:17

## 2019-05-09 RX ADMIN — DIVALPROEX SODIUM 1500 MILLIGRAM(S): 500 TABLET, DELAYED RELEASE ORAL at 08:11

## 2019-05-09 RX ADMIN — Medication 1 DROP(S): at 08:11

## 2019-05-10 PROCEDURE — 99232 SBSQ HOSP IP/OBS MODERATE 35: CPT

## 2019-05-10 PROCEDURE — 99232 SBSQ HOSP IP/OBS MODERATE 35: CPT | Mod: GC

## 2019-05-10 RX ORDER — LANOLIN ALCOHOL/MO/W.PET/CERES
3 CREAM (GRAM) TOPICAL AT BEDTIME
Refills: 0 | Status: DISCONTINUED | OUTPATIENT
Start: 2019-05-10 | End: 2019-05-31

## 2019-05-10 RX ADMIN — DIVALPROEX SODIUM 1500 MILLIGRAM(S): 500 TABLET, DELAYED RELEASE ORAL at 21:29

## 2019-05-10 RX ADMIN — Medication 1 DROP(S): at 08:04

## 2019-05-10 RX ADMIN — OLANZAPINE 10 MILLIGRAM(S): 15 TABLET, FILM COATED ORAL at 08:05

## 2019-05-10 RX ADMIN — DIVALPROEX SODIUM 1500 MILLIGRAM(S): 500 TABLET, DELAYED RELEASE ORAL at 08:05

## 2019-05-10 RX ADMIN — Medication 1 MILLIGRAM(S): at 21:28

## 2019-05-10 RX ADMIN — Medication 1 MILLIGRAM(S): at 08:04

## 2019-05-10 RX ADMIN — Medication 1 DROP(S): at 14:33

## 2019-05-10 RX ADMIN — OLANZAPINE 10 MILLIGRAM(S): 15 TABLET, FILM COATED ORAL at 21:27

## 2019-05-10 RX ADMIN — Medication 3 MILLIGRAM(S): at 22:38

## 2019-05-10 NOTE — PROGRESS NOTE ADULT - ASSESSMENT
23y/M  PMH of Schizoaffective, bipolar type, previous  hospitalizations [facility] with psychiatric decompenstion   Medicine consulted for elevated CK    # Rhabdomyolysis with elevation in CK- in the setting of IM injections  CK improving, BMP stable- no ELIANE  clinically no muscle symptoms or soreness  Encourage PO , avoid IM injections  Monitor  BMP/CK     # Seasonal allergies-with conjunctival redness- On artificial tears     # H/o  Schizoaffective, bipolar type with episodes of agitation- Management as per psych

## 2019-05-10 NOTE — PROGRESS NOTE ADULT - SUBJECTIVE AND OBJECTIVE BOX
Patient is a 23y old  Male who presents with a chief complaint of     SUBJECTIVE / OVERNIGHT EVENTS: pt seen and examined by me       MEDICATIONS  (STANDING):  artificial  tears Solution 1 Drop(s) Both EYES three times a day  clonazePAM  Tablet 1 milliGRAM(s) Oral two times a day  diVALproex ER 1500 milliGRAM(s) Oral <User Schedule>  OLANZapine 10 milliGRAM(s) Oral two times a day    MEDICATIONS  (PRN):  diphenhydrAMINE 50 milliGRAM(s) Oral every 6 hours PRN agitation  diphenhydrAMINE   Injectable 50 milliGRAM(s) IntraMuscular once PRN severe agitation  LORazepam     Tablet 3 milliGRAM(s) Oral every 6 hours PRN anxiety/agitation  LORazepam   Injectable 2 milliGRAM(s) IntraMuscular once PRN severe agitation  LORazepam   Injectable 3 milliGRAM(s) IntraMuscular once PRN severe agitation  OLANZapine 10 milliGRAM(s) Oral daily PRN severe agitation        Vital Signs Last 24 Hrs  T(C): 36.8 (10 May 2019 08:50), Max: 36.8 (10 May 2019 08:50)  T(F): 98.2 (10 May 2019 08:50), Max: 98.2 (10 May 2019 08:50)  HR: 90 (10 May 2019 08:50) (90 - 90)  BP: 100/60 (10 May 2019 08:50) (100/60 - 100/60)  BP(mean): --  RR: --  SpO2: --  CAPILLARY BLOOD GLUCOSE        I&O's Summary      PHYSICAL EXAM:  GENERAL: NAD, well-developed  HEAD:  Atraumatic, Normocephalic  EYES: EOMI, PERRLA, conjunctiva and sclera clear  NECK: Supple, No JVD  CHEST/LUNG: Clear to auscultation bilaterally; No wheeze  HEART: Regular rate and rhythm; No murmurs, rubs, or gallops  ABDOMEN: Soft, Nontender, Nondistended; Bowel sounds present  EXTREMITIES:  2+ Peripheral Pulses, No clubbing, cyanosis, or edema  PSYCH: AAOx3  NEUROLOGY: non-focal  SKIN: No rashes or lesions    LABS:    05-09    138  |  98  |  18  ----------------------------<  88  4.1   |  28  |  0.97    Ca    9.5      09 May 2019 08:28        CARDIAC MARKERS ( 09 May 2019 08:28 )  x     / x     / 1464 u/L / x     / x              RADIOLOGY & ADDITIONAL TESTS:    Imaging Personally Reviewed:    Consultant(s) Notes Reviewed:      Care Discussed with Consultants/Other Providers: Patient is a 23y old  Male who presents with a chief complaint of     SUBJECTIVE / OVERNIGHT EVENTS: pt seen and examined by me  Feels well, no muscle cramps or soreness   States he is drinking 8-10 glasses of water/ day        MEDICATIONS  (STANDING):  artificial  tears Solution 1 Drop(s) Both EYES three times a day  clonazePAM  Tablet 1 milliGRAM(s) Oral two times a day  diVALproex ER 1500 milliGRAM(s) Oral <User Schedule>  OLANZapine 10 milliGRAM(s) Oral two times a day    MEDICATIONS  (PRN):  diphenhydrAMINE 50 milliGRAM(s) Oral every 6 hours PRN agitation  diphenhydrAMINE   Injectable 50 milliGRAM(s) IntraMuscular once PRN severe agitation  LORazepam     Tablet 3 milliGRAM(s) Oral every 6 hours PRN anxiety/agitation  LORazepam   Injectable 2 milliGRAM(s) IntraMuscular once PRN severe agitation  LORazepam   Injectable 3 milliGRAM(s) IntraMuscular once PRN severe agitation  OLANZapine 10 milliGRAM(s) Oral daily PRN severe agitation        Vital Signs Last 24 Hrs  T(C): 36.8 (10 May 2019 08:50), Max: 36.8 (10 May 2019 08:50)  T(F): 98.2 (10 May 2019 08:50), Max: 98.2 (10 May 2019 08:50)  HR: 90 (10 May 2019 08:50) (90 - 90)  BP: 100/60 (10 May 2019 08:50) (100/60 - 100/60)  BP(mean): --  RR: --  SpO2: --  CAPILLARY BLOOD GLUCOSE        I&O's Summary      PHYSICAL EXAM:  GENERAL: NAD, well-developed  HEAD:  Atraumatic, Normocephalic  EYES: conjunctival erythema  NECK: Supple, No JVD  CHEST/LUNG: Clear to auscultation bilaterally; No wheeze  HEART: Regular rate and rhythm; No murmurs, rubs, or gallops  ABDOMEN: Soft, Nontender, Nondistended; Bowel sounds present  EXTREMITIES:  2+ Peripheral Pulses, No clubbing, cyanosis, or edema  PSYCH: AAOx3  NEUROLOGY: non-focal  SKIN: No rashes or lesions    LABS:    05-09    138  |  98  |  18  ----------------------------<  88  4.1   |  28  |  0.97    Ca    9.5      09 May 2019 08:28        CARDIAC MARKERS ( 09 May 2019 08:28 )  x     / x     / 1464 u/L / x     / x              RADIOLOGY & ADDITIONAL TESTS:    Imaging Personally Reviewed:    Consultant(s) Notes Reviewed:      Care Discussed with Consultants/Other Providers:

## 2019-05-11 LAB
ALBUMIN SERPL ELPH-MCNC: 4.21 G/DL — SIGNIFICANT CHANGE UP (ref 3.3–5)
ALP SERPL-CCNC: 79 U/L — SIGNIFICANT CHANGE UP (ref 40–120)
ALT FLD-CCNC: 19 U/L — SIGNIFICANT CHANGE UP (ref 4–41)
ANION GAP SERPL CALC-SCNC: 13 MMO/L — SIGNIFICANT CHANGE UP (ref 7–14)
AST SERPL-CCNC: 25 U/L — SIGNIFICANT CHANGE UP (ref 4–40)
BILIRUB SERPL-MCNC: 0.2 MG/DL — SIGNIFICANT CHANGE UP (ref 0.2–1.2)
BUN SERPL-MCNC: 19 MG/DL — SIGNIFICANT CHANGE UP (ref 7–23)
CALCIUM SERPL-MCNC: 9.5 MG/DL — SIGNIFICANT CHANGE UP (ref 8.4–10.5)
CHLORIDE SERPL-SCNC: 97 MMOL/L — LOW (ref 98–107)
CO2 SERPL-SCNC: 29 MMOL/L — SIGNIFICANT CHANGE UP (ref 22–31)
CREAT SERPL-MCNC: 1 MG/DL — SIGNIFICANT CHANGE UP (ref 0.5–1.3)
GLUCOSE SERPL-MCNC: 110 MG/DL — HIGH (ref 70–99)
POTASSIUM SERPL-MCNC: 4.2 MMOL/L — SIGNIFICANT CHANGE UP (ref 3.5–5.3)
POTASSIUM SERPL-SCNC: 4.2 MMOL/L — SIGNIFICANT CHANGE UP (ref 3.5–5.3)
PROT SERPL-MCNC: 7.1 G/DL — SIGNIFICANT CHANGE UP (ref 6–8.3)
SODIUM SERPL-SCNC: 139 MMOL/L — SIGNIFICANT CHANGE UP (ref 135–145)
VALPROATE SERPL-MCNC: 92.1 UG/ML — SIGNIFICANT CHANGE UP (ref 50–100)

## 2019-05-11 RX ADMIN — Medication 1 DROP(S): at 20:54

## 2019-05-11 RX ADMIN — OLANZAPINE 10 MILLIGRAM(S): 15 TABLET, FILM COATED ORAL at 08:46

## 2019-05-11 RX ADMIN — OLANZAPINE 10 MILLIGRAM(S): 15 TABLET, FILM COATED ORAL at 21:02

## 2019-05-11 RX ADMIN — Medication 1 DROP(S): at 08:47

## 2019-05-11 RX ADMIN — DIVALPROEX SODIUM 1500 MILLIGRAM(S): 500 TABLET, DELAYED RELEASE ORAL at 08:47

## 2019-05-11 RX ADMIN — Medication 1 DROP(S): at 12:20

## 2019-05-11 RX ADMIN — Medication 1 MILLIGRAM(S): at 20:54

## 2019-05-11 RX ADMIN — DIVALPROEX SODIUM 1500 MILLIGRAM(S): 500 TABLET, DELAYED RELEASE ORAL at 21:02

## 2019-05-11 RX ADMIN — Medication 1 MILLIGRAM(S): at 08:47

## 2019-05-12 RX ADMIN — OLANZAPINE 10 MILLIGRAM(S): 15 TABLET, FILM COATED ORAL at 21:26

## 2019-05-12 RX ADMIN — Medication 1 DROP(S): at 13:01

## 2019-05-12 RX ADMIN — Medication 1 DROP(S): at 21:27

## 2019-05-12 RX ADMIN — Medication 1 MILLIGRAM(S): at 08:18

## 2019-05-12 RX ADMIN — Medication 1 DROP(S): at 08:18

## 2019-05-12 RX ADMIN — Medication 50 MILLIGRAM(S): at 10:31

## 2019-05-12 RX ADMIN — DIVALPROEX SODIUM 1500 MILLIGRAM(S): 500 TABLET, DELAYED RELEASE ORAL at 21:26

## 2019-05-12 RX ADMIN — OLANZAPINE 10 MILLIGRAM(S): 15 TABLET, FILM COATED ORAL at 08:18

## 2019-05-12 RX ADMIN — DIVALPROEX SODIUM 1500 MILLIGRAM(S): 500 TABLET, DELAYED RELEASE ORAL at 08:18

## 2019-05-12 RX ADMIN — Medication 1 MILLIGRAM(S): at 21:27

## 2019-05-12 RX ADMIN — OLANZAPINE 10 MILLIGRAM(S): 15 TABLET, FILM COATED ORAL at 10:31

## 2019-05-13 LAB
ANION GAP SERPL CALC-SCNC: 13 MMO/L — SIGNIFICANT CHANGE UP (ref 7–14)
BUN SERPL-MCNC: 24 MG/DL — HIGH (ref 7–23)
CALCIUM SERPL-MCNC: 9.9 MG/DL — SIGNIFICANT CHANGE UP (ref 8.4–10.5)
CHLORIDE SERPL-SCNC: 98 MMOL/L — SIGNIFICANT CHANGE UP (ref 98–107)
CK SERPL-CCNC: 489 U/L — HIGH (ref 30–200)
CO2 SERPL-SCNC: 30 MMOL/L — SIGNIFICANT CHANGE UP (ref 22–31)
CREAT SERPL-MCNC: 1.06 MG/DL — SIGNIFICANT CHANGE UP (ref 0.5–1.3)
GLUCOSE SERPL-MCNC: 72 MG/DL — SIGNIFICANT CHANGE UP (ref 70–99)
POTASSIUM SERPL-MCNC: 4.2 MMOL/L — SIGNIFICANT CHANGE UP (ref 3.5–5.3)
POTASSIUM SERPL-SCNC: 4.2 MMOL/L — SIGNIFICANT CHANGE UP (ref 3.5–5.3)
SODIUM SERPL-SCNC: 141 MMOL/L — SIGNIFICANT CHANGE UP (ref 135–145)

## 2019-05-13 PROCEDURE — 99232 SBSQ HOSP IP/OBS MODERATE 35: CPT | Mod: GC

## 2019-05-13 RX ORDER — OLANZAPINE 15 MG/1
10 TABLET, FILM COATED ORAL DAILY
Refills: 0 | Status: DISCONTINUED | OUTPATIENT
Start: 2019-05-13 | End: 2019-05-15

## 2019-05-13 RX ORDER — OLANZAPINE 15 MG/1
15 TABLET, FILM COATED ORAL AT BEDTIME
Refills: 0 | Status: DISCONTINUED | OUTPATIENT
Start: 2019-05-13 | End: 2019-05-15

## 2019-05-13 RX ADMIN — Medication 3 MILLIGRAM(S): at 22:15

## 2019-05-13 RX ADMIN — OLANZAPINE 10 MILLIGRAM(S): 15 TABLET, FILM COATED ORAL at 09:12

## 2019-05-13 RX ADMIN — Medication 1 DROP(S): at 09:51

## 2019-05-13 RX ADMIN — Medication 1 MILLIGRAM(S): at 21:30

## 2019-05-13 RX ADMIN — DIVALPROEX SODIUM 1500 MILLIGRAM(S): 500 TABLET, DELAYED RELEASE ORAL at 21:30

## 2019-05-13 RX ADMIN — Medication 1 MILLIGRAM(S): at 09:12

## 2019-05-13 RX ADMIN — Medication 1 DROP(S): at 13:53

## 2019-05-13 RX ADMIN — OLANZAPINE 15 MILLIGRAM(S): 15 TABLET, FILM COATED ORAL at 21:31

## 2019-05-13 RX ADMIN — DIVALPROEX SODIUM 1500 MILLIGRAM(S): 500 TABLET, DELAYED RELEASE ORAL at 09:12

## 2019-05-14 PROCEDURE — 99232 SBSQ HOSP IP/OBS MODERATE 35: CPT

## 2019-05-14 RX ORDER — LITHIUM CARBONATE 300 MG/1
600 TABLET, EXTENDED RELEASE ORAL AT BEDTIME
Refills: 0 | Status: DISCONTINUED | OUTPATIENT
Start: 2019-05-14 | End: 2019-05-17

## 2019-05-14 RX ORDER — LITHIUM CARBONATE 300 MG/1
600 TABLET, EXTENDED RELEASE ORAL ONCE
Refills: 0 | Status: DISCONTINUED | OUTPATIENT
Start: 2019-05-14 | End: 2019-05-14

## 2019-05-14 RX ADMIN — OLANZAPINE 10 MILLIGRAM(S): 15 TABLET, FILM COATED ORAL at 08:47

## 2019-05-14 RX ADMIN — OLANZAPINE 15 MILLIGRAM(S): 15 TABLET, FILM COATED ORAL at 20:26

## 2019-05-14 RX ADMIN — Medication 1 MILLIGRAM(S): at 20:26

## 2019-05-14 RX ADMIN — Medication 3 MILLIGRAM(S): at 22:27

## 2019-05-14 RX ADMIN — Medication 1 DROP(S): at 08:47

## 2019-05-14 RX ADMIN — Medication 1 DROP(S): at 13:11

## 2019-05-14 RX ADMIN — Medication 1 DROP(S): at 20:26

## 2019-05-14 RX ADMIN — DIVALPROEX SODIUM 1500 MILLIGRAM(S): 500 TABLET, DELAYED RELEASE ORAL at 08:47

## 2019-05-14 RX ADMIN — Medication 1 MILLIGRAM(S): at 08:47

## 2019-05-14 RX ADMIN — DIVALPROEX SODIUM 1500 MILLIGRAM(S): 500 TABLET, DELAYED RELEASE ORAL at 21:17

## 2019-05-15 PROCEDURE — 99232 SBSQ HOSP IP/OBS MODERATE 35: CPT | Mod: GC

## 2019-05-15 RX ORDER — OLANZAPINE 15 MG/1
10 TABLET, FILM COATED ORAL DAILY
Refills: 0 | Status: DISCONTINUED | OUTPATIENT
Start: 2019-05-15 | End: 2019-05-16

## 2019-05-15 RX ORDER — OLANZAPINE 15 MG/1
20 TABLET, FILM COATED ORAL AT BEDTIME
Refills: 0 | Status: DISCONTINUED | OUTPATIENT
Start: 2019-05-15 | End: 2019-05-30

## 2019-05-15 RX ADMIN — Medication 1 DROP(S): at 21:24

## 2019-05-15 RX ADMIN — Medication 1 MILLIGRAM(S): at 08:18

## 2019-05-15 RX ADMIN — LITHIUM CARBONATE 600 MILLIGRAM(S): 300 TABLET, EXTENDED RELEASE ORAL at 21:24

## 2019-05-15 RX ADMIN — Medication 1 DROP(S): at 08:18

## 2019-05-15 RX ADMIN — OLANZAPINE 10 MILLIGRAM(S): 15 TABLET, FILM COATED ORAL at 08:18

## 2019-05-15 RX ADMIN — DIVALPROEX SODIUM 1500 MILLIGRAM(S): 500 TABLET, DELAYED RELEASE ORAL at 21:24

## 2019-05-15 RX ADMIN — Medication 50 MILLIGRAM(S): at 07:45

## 2019-05-15 RX ADMIN — OLANZAPINE 20 MILLIGRAM(S): 15 TABLET, FILM COATED ORAL at 21:23

## 2019-05-15 RX ADMIN — OLANZAPINE 10 MILLIGRAM(S): 15 TABLET, FILM COATED ORAL at 14:23

## 2019-05-15 RX ADMIN — Medication 1 DROP(S): at 13:37

## 2019-05-15 RX ADMIN — DIVALPROEX SODIUM 1500 MILLIGRAM(S): 500 TABLET, DELAYED RELEASE ORAL at 08:18

## 2019-05-15 RX ADMIN — Medication 3 MILLIGRAM(S): at 10:17

## 2019-05-15 RX ADMIN — Medication 1 MILLIGRAM(S): at 21:24

## 2019-05-16 PROCEDURE — 99232 SBSQ HOSP IP/OBS MODERATE 35: CPT | Mod: GC

## 2019-05-16 RX ORDER — CLONAZEPAM 1 MG
2 TABLET ORAL DAILY
Refills: 0 | Status: DISCONTINUED | OUTPATIENT
Start: 2019-05-16 | End: 2019-05-20

## 2019-05-16 RX ORDER — HALOPERIDOL DECANOATE 100 MG/ML
5 INJECTION INTRAMUSCULAR ONCE
Refills: 0 | Status: COMPLETED | OUTPATIENT
Start: 2019-05-16 | End: 2019-05-16

## 2019-05-16 RX ORDER — CLONAZEPAM 1 MG
1 TABLET ORAL
Refills: 0 | Status: DISCONTINUED | OUTPATIENT
Start: 2019-05-16 | End: 2019-05-20

## 2019-05-16 RX ORDER — DIPHENHYDRAMINE HCL 50 MG
50 CAPSULE ORAL ONCE
Refills: 0 | Status: COMPLETED | OUTPATIENT
Start: 2019-05-16 | End: 2019-05-16

## 2019-05-16 RX ORDER — CLONAZEPAM 1 MG
1 TABLET ORAL
Refills: 0 | Status: DISCONTINUED | OUTPATIENT
Start: 2019-05-16 | End: 2019-05-16

## 2019-05-16 RX ORDER — OLANZAPINE 15 MG/1
10 TABLET, FILM COATED ORAL
Refills: 0 | Status: DISCONTINUED | OUTPATIENT
Start: 2019-05-16 | End: 2019-05-30

## 2019-05-16 RX ORDER — HALOPERIDOL DECANOATE 100 MG/ML
5 INJECTION INTRAMUSCULAR ONCE
Refills: 0 | Status: DISCONTINUED | OUTPATIENT
Start: 2019-05-16 | End: 2019-05-17

## 2019-05-16 RX ORDER — CLONAZEPAM 1 MG
2 TABLET ORAL ONCE
Refills: 0 | Status: DISCONTINUED | OUTPATIENT
Start: 2019-05-16 | End: 2019-05-16

## 2019-05-16 RX ORDER — OLANZAPINE 15 MG/1
10 TABLET, FILM COATED ORAL ONCE
Refills: 0 | Status: COMPLETED | OUTPATIENT
Start: 2019-05-16 | End: 2019-05-16

## 2019-05-16 RX ADMIN — Medication 2 MILLIGRAM(S): at 15:52

## 2019-05-16 RX ADMIN — Medication 1 DROP(S): at 09:41

## 2019-05-16 RX ADMIN — DIVALPROEX SODIUM 1500 MILLIGRAM(S): 500 TABLET, DELAYED RELEASE ORAL at 21:01

## 2019-05-16 RX ADMIN — OLANZAPINE 10 MILLIGRAM(S): 15 TABLET, FILM COATED ORAL at 11:32

## 2019-05-16 RX ADMIN — Medication 1 DROP(S): at 15:05

## 2019-05-16 RX ADMIN — Medication 50 MILLIGRAM(S): at 08:25

## 2019-05-16 RX ADMIN — Medication 1 MILLIGRAM(S): at 21:01

## 2019-05-16 RX ADMIN — Medication 3 MILLIGRAM(S): at 11:36

## 2019-05-16 RX ADMIN — Medication 3 MILLIGRAM(S): at 08:25

## 2019-05-16 RX ADMIN — HALOPERIDOL DECANOATE 5 MILLIGRAM(S): 100 INJECTION INTRAMUSCULAR at 08:25

## 2019-05-16 RX ADMIN — Medication 1 DROP(S): at 21:01

## 2019-05-16 RX ADMIN — Medication 50 MILLIGRAM(S): at 15:52

## 2019-05-16 RX ADMIN — Medication 2 MILLIGRAM(S): at 09:41

## 2019-05-16 RX ADMIN — DIVALPROEX SODIUM 1500 MILLIGRAM(S): 500 TABLET, DELAYED RELEASE ORAL at 09:41

## 2019-05-16 RX ADMIN — OLANZAPINE 10 MILLIGRAM(S): 15 TABLET, FILM COATED ORAL at 09:41

## 2019-05-16 RX ADMIN — LITHIUM CARBONATE 600 MILLIGRAM(S): 300 TABLET, EXTENDED RELEASE ORAL at 21:01

## 2019-05-16 RX ADMIN — OLANZAPINE 20 MILLIGRAM(S): 15 TABLET, FILM COATED ORAL at 21:01

## 2019-05-17 LAB
ANION GAP SERPL CALC-SCNC: 10 MMO/L — SIGNIFICANT CHANGE UP (ref 7–14)
BUN SERPL-MCNC: 21 MG/DL — SIGNIFICANT CHANGE UP (ref 7–23)
CALCIUM SERPL-MCNC: 9.5 MG/DL — SIGNIFICANT CHANGE UP (ref 8.4–10.5)
CHLORIDE SERPL-SCNC: 98 MMOL/L — SIGNIFICANT CHANGE UP (ref 98–107)
CK SERPL-CCNC: 1594 U/L — HIGH (ref 30–200)
CO2 SERPL-SCNC: 26 MMOL/L — SIGNIFICANT CHANGE UP (ref 22–31)
CREAT SERPL-MCNC: 0.91 MG/DL — SIGNIFICANT CHANGE UP (ref 0.5–1.3)
GLUCOSE SERPL-MCNC: 83 MG/DL — SIGNIFICANT CHANGE UP (ref 70–99)
MAGNESIUM SERPL-MCNC: 2 MG/DL — SIGNIFICANT CHANGE UP (ref 1.6–2.6)
PHOSPHATE SERPL-MCNC: 4 MG/DL — SIGNIFICANT CHANGE UP (ref 2.5–4.5)
POTASSIUM SERPL-MCNC: 4.3 MMOL/L — SIGNIFICANT CHANGE UP (ref 3.5–5.3)
POTASSIUM SERPL-SCNC: 4.3 MMOL/L — SIGNIFICANT CHANGE UP (ref 3.5–5.3)
SODIUM SERPL-SCNC: 134 MMOL/L — LOW (ref 135–145)

## 2019-05-17 PROCEDURE — 99232 SBSQ HOSP IP/OBS MODERATE 35: CPT

## 2019-05-17 RX ORDER — LITHIUM CARBONATE 300 MG/1
900 TABLET, EXTENDED RELEASE ORAL AT BEDTIME
Refills: 0 | Status: DISCONTINUED | OUTPATIENT
Start: 2019-05-17 | End: 2019-05-24

## 2019-05-17 RX ADMIN — OLANZAPINE 10 MILLIGRAM(S): 15 TABLET, FILM COATED ORAL at 08:22

## 2019-05-17 RX ADMIN — Medication 1 MILLIGRAM(S): at 13:45

## 2019-05-17 RX ADMIN — DIVALPROEX SODIUM 1500 MILLIGRAM(S): 500 TABLET, DELAYED RELEASE ORAL at 20:21

## 2019-05-17 RX ADMIN — LITHIUM CARBONATE 900 MILLIGRAM(S): 300 TABLET, EXTENDED RELEASE ORAL at 20:21

## 2019-05-17 RX ADMIN — Medication 1 DROP(S): at 13:50

## 2019-05-17 RX ADMIN — DIVALPROEX SODIUM 1500 MILLIGRAM(S): 500 TABLET, DELAYED RELEASE ORAL at 08:22

## 2019-05-17 RX ADMIN — Medication 1 DROP(S): at 20:20

## 2019-05-17 RX ADMIN — Medication 1 DROP(S): at 08:23

## 2019-05-17 RX ADMIN — Medication 2 MILLIGRAM(S): at 08:22

## 2019-05-17 RX ADMIN — OLANZAPINE 10 MILLIGRAM(S): 15 TABLET, FILM COATED ORAL at 13:45

## 2019-05-17 RX ADMIN — Medication 1 MILLIGRAM(S): at 20:21

## 2019-05-17 RX ADMIN — OLANZAPINE 20 MILLIGRAM(S): 15 TABLET, FILM COATED ORAL at 20:21

## 2019-05-17 RX ADMIN — Medication 3 MILLIGRAM(S): at 11:32

## 2019-05-18 PROCEDURE — 99231 SBSQ HOSP IP/OBS SF/LOW 25: CPT

## 2019-05-18 RX ADMIN — Medication 1 DROP(S): at 13:13

## 2019-05-18 RX ADMIN — DIVALPROEX SODIUM 1500 MILLIGRAM(S): 500 TABLET, DELAYED RELEASE ORAL at 08:11

## 2019-05-18 RX ADMIN — OLANZAPINE 20 MILLIGRAM(S): 15 TABLET, FILM COATED ORAL at 21:14

## 2019-05-18 RX ADMIN — Medication 1 DROP(S): at 08:11

## 2019-05-18 RX ADMIN — Medication 1 MILLIGRAM(S): at 21:14

## 2019-05-18 RX ADMIN — Medication 50 MILLIGRAM(S): at 11:01

## 2019-05-18 RX ADMIN — LITHIUM CARBONATE 900 MILLIGRAM(S): 300 TABLET, EXTENDED RELEASE ORAL at 21:14

## 2019-05-18 RX ADMIN — OLANZAPINE 10 MILLIGRAM(S): 15 TABLET, FILM COATED ORAL at 08:11

## 2019-05-18 RX ADMIN — Medication 1 MILLIGRAM(S): at 13:13

## 2019-05-18 RX ADMIN — Medication 3 MILLIGRAM(S): at 11:01

## 2019-05-18 RX ADMIN — OLANZAPINE 10 MILLIGRAM(S): 15 TABLET, FILM COATED ORAL at 13:13

## 2019-05-18 RX ADMIN — DIVALPROEX SODIUM 1500 MILLIGRAM(S): 500 TABLET, DELAYED RELEASE ORAL at 21:14

## 2019-05-18 RX ADMIN — Medication 1 DROP(S): at 21:14

## 2019-05-18 RX ADMIN — Medication 2 MILLIGRAM(S): at 08:11

## 2019-05-19 PROCEDURE — 99231 SBSQ HOSP IP/OBS SF/LOW 25: CPT

## 2019-05-19 RX ADMIN — Medication 1 DROP(S): at 20:57

## 2019-05-19 RX ADMIN — Medication 1 DROP(S): at 14:08

## 2019-05-19 RX ADMIN — Medication 1 MILLIGRAM(S): at 20:57

## 2019-05-19 RX ADMIN — DIVALPROEX SODIUM 1500 MILLIGRAM(S): 500 TABLET, DELAYED RELEASE ORAL at 20:57

## 2019-05-19 RX ADMIN — Medication 1 DROP(S): at 08:06

## 2019-05-19 RX ADMIN — DIVALPROEX SODIUM 1500 MILLIGRAM(S): 500 TABLET, DELAYED RELEASE ORAL at 08:06

## 2019-05-19 RX ADMIN — OLANZAPINE 20 MILLIGRAM(S): 15 TABLET, FILM COATED ORAL at 20:56

## 2019-05-19 RX ADMIN — Medication 2 MILLIGRAM(S): at 08:06

## 2019-05-19 RX ADMIN — OLANZAPINE 10 MILLIGRAM(S): 15 TABLET, FILM COATED ORAL at 08:06

## 2019-05-19 RX ADMIN — OLANZAPINE 10 MILLIGRAM(S): 15 TABLET, FILM COATED ORAL at 14:08

## 2019-05-19 RX ADMIN — LITHIUM CARBONATE 900 MILLIGRAM(S): 300 TABLET, EXTENDED RELEASE ORAL at 20:57

## 2019-05-19 RX ADMIN — Medication 1 MILLIGRAM(S): at 14:08

## 2019-05-20 PROCEDURE — 99232 SBSQ HOSP IP/OBS MODERATE 35: CPT

## 2019-05-20 RX ORDER — CLONAZEPAM 1 MG
2 TABLET ORAL THREE TIMES A DAY
Refills: 0 | Status: DISCONTINUED | OUTPATIENT
Start: 2019-05-20 | End: 2019-05-23

## 2019-05-20 RX ORDER — HALOPERIDOL DECANOATE 100 MG/ML
5 INJECTION INTRAMUSCULAR ONCE
Refills: 0 | Status: DISCONTINUED | OUTPATIENT
Start: 2019-05-20 | End: 2019-05-20

## 2019-05-20 RX ORDER — DIPHENHYDRAMINE HCL 50 MG
50 CAPSULE ORAL ONCE
Refills: 0 | Status: COMPLETED | OUTPATIENT
Start: 2019-05-20 | End: 2019-05-20

## 2019-05-20 RX ADMIN — Medication 2 MILLIGRAM(S): at 13:29

## 2019-05-20 RX ADMIN — Medication 50 MILLIGRAM(S): at 11:05

## 2019-05-20 RX ADMIN — OLANZAPINE 10 MILLIGRAM(S): 15 TABLET, FILM COATED ORAL at 13:29

## 2019-05-20 RX ADMIN — OLANZAPINE 20 MILLIGRAM(S): 15 TABLET, FILM COATED ORAL at 21:17

## 2019-05-20 RX ADMIN — DIVALPROEX SODIUM 1500 MILLIGRAM(S): 500 TABLET, DELAYED RELEASE ORAL at 07:39

## 2019-05-20 RX ADMIN — Medication 1 DROP(S): at 08:13

## 2019-05-20 RX ADMIN — Medication 2 MILLIGRAM(S): at 07:39

## 2019-05-20 RX ADMIN — Medication 1 DROP(S): at 13:29

## 2019-05-20 RX ADMIN — OLANZAPINE 10 MILLIGRAM(S): 15 TABLET, FILM COATED ORAL at 07:47

## 2019-05-20 RX ADMIN — LITHIUM CARBONATE 900 MILLIGRAM(S): 300 TABLET, EXTENDED RELEASE ORAL at 21:17

## 2019-05-20 RX ADMIN — DIVALPROEX SODIUM 1500 MILLIGRAM(S): 500 TABLET, DELAYED RELEASE ORAL at 21:17

## 2019-05-20 RX ADMIN — Medication 1 DROP(S): at 21:16

## 2019-05-20 RX ADMIN — Medication 2 MILLIGRAM(S): at 21:16

## 2019-05-20 RX ADMIN — OLANZAPINE 10 MILLIGRAM(S): 15 TABLET, FILM COATED ORAL at 07:39

## 2019-05-20 RX ADMIN — Medication 2 MILLIGRAM(S): at 11:06

## 2019-05-21 LAB
ALBUMIN SERPL ELPH-MCNC: 4.4 G/DL — SIGNIFICANT CHANGE UP (ref 3.3–5)
ALP SERPL-CCNC: 65 U/L — SIGNIFICANT CHANGE UP (ref 40–120)
ALT FLD-CCNC: 16 U/L — SIGNIFICANT CHANGE UP (ref 4–41)
ANION GAP SERPL CALC-SCNC: 11 MMO/L — SIGNIFICANT CHANGE UP (ref 7–14)
AST SERPL-CCNC: 27 U/L — SIGNIFICANT CHANGE UP (ref 4–40)
BILIRUB SERPL-MCNC: 0.3 MG/DL — SIGNIFICANT CHANGE UP (ref 0.2–1.2)
BUN SERPL-MCNC: 20 MG/DL — SIGNIFICANT CHANGE UP (ref 7–23)
CALCIUM SERPL-MCNC: 9.7 MG/DL — SIGNIFICANT CHANGE UP (ref 8.4–10.5)
CHLORIDE SERPL-SCNC: 100 MMOL/L — SIGNIFICANT CHANGE UP (ref 98–107)
CK SERPL-CCNC: 949 U/L — HIGH (ref 30–200)
CO2 SERPL-SCNC: 29 MMOL/L — SIGNIFICANT CHANGE UP (ref 22–31)
CREAT SERPL-MCNC: 1.06 MG/DL — SIGNIFICANT CHANGE UP (ref 0.5–1.3)
GLUCOSE SERPL-MCNC: 98 MG/DL — SIGNIFICANT CHANGE UP (ref 70–99)
LITHIUM SERPL-MCNC: 0.68 MMOL/L — SIGNIFICANT CHANGE UP (ref 0.6–1.2)
POTASSIUM SERPL-MCNC: 4.5 MMOL/L — SIGNIFICANT CHANGE UP (ref 3.5–5.3)
POTASSIUM SERPL-SCNC: 4.5 MMOL/L — SIGNIFICANT CHANGE UP (ref 3.5–5.3)
PROT SERPL-MCNC: 7.4 G/DL — SIGNIFICANT CHANGE UP (ref 6–8.3)
SODIUM SERPL-SCNC: 140 MMOL/L — SIGNIFICANT CHANGE UP (ref 135–145)

## 2019-05-21 PROCEDURE — 99232 SBSQ HOSP IP/OBS MODERATE 35: CPT

## 2019-05-21 RX ADMIN — DIVALPROEX SODIUM 1500 MILLIGRAM(S): 500 TABLET, DELAYED RELEASE ORAL at 21:51

## 2019-05-21 RX ADMIN — DIVALPROEX SODIUM 1500 MILLIGRAM(S): 500 TABLET, DELAYED RELEASE ORAL at 08:11

## 2019-05-21 RX ADMIN — OLANZAPINE 10 MILLIGRAM(S): 15 TABLET, FILM COATED ORAL at 12:34

## 2019-05-21 RX ADMIN — Medication 1 DROP(S): at 12:34

## 2019-05-21 RX ADMIN — Medication 2 MILLIGRAM(S): at 08:11

## 2019-05-21 RX ADMIN — LITHIUM CARBONATE 900 MILLIGRAM(S): 300 TABLET, EXTENDED RELEASE ORAL at 21:51

## 2019-05-21 RX ADMIN — OLANZAPINE 10 MILLIGRAM(S): 15 TABLET, FILM COATED ORAL at 08:11

## 2019-05-21 RX ADMIN — Medication 2 MILLIGRAM(S): at 12:34

## 2019-05-21 RX ADMIN — Medication 2 MILLIGRAM(S): at 21:51

## 2019-05-21 RX ADMIN — Medication 1 DROP(S): at 21:51

## 2019-05-21 RX ADMIN — Medication 1 DROP(S): at 08:11

## 2019-05-21 RX ADMIN — OLANZAPINE 20 MILLIGRAM(S): 15 TABLET, FILM COATED ORAL at 21:51

## 2019-05-22 PROCEDURE — 99232 SBSQ HOSP IP/OBS MODERATE 35: CPT | Mod: GC

## 2019-05-22 RX ADMIN — DIVALPROEX SODIUM 1500 MILLIGRAM(S): 500 TABLET, DELAYED RELEASE ORAL at 08:04

## 2019-05-22 RX ADMIN — OLANZAPINE 10 MILLIGRAM(S): 15 TABLET, FILM COATED ORAL at 10:33

## 2019-05-22 RX ADMIN — OLANZAPINE 20 MILLIGRAM(S): 15 TABLET, FILM COATED ORAL at 21:18

## 2019-05-22 RX ADMIN — LITHIUM CARBONATE 900 MILLIGRAM(S): 300 TABLET, EXTENDED RELEASE ORAL at 21:18

## 2019-05-22 RX ADMIN — Medication 3 MILLIGRAM(S): at 15:16

## 2019-05-22 RX ADMIN — OLANZAPINE 10 MILLIGRAM(S): 15 TABLET, FILM COATED ORAL at 08:04

## 2019-05-22 RX ADMIN — Medication 1 DROP(S): at 13:21

## 2019-05-22 RX ADMIN — Medication 2 MILLIGRAM(S): at 13:21

## 2019-05-22 RX ADMIN — Medication 2 MILLIGRAM(S): at 08:04

## 2019-05-22 RX ADMIN — DIVALPROEX SODIUM 1500 MILLIGRAM(S): 500 TABLET, DELAYED RELEASE ORAL at 21:18

## 2019-05-22 RX ADMIN — Medication 1 DROP(S): at 08:04

## 2019-05-22 RX ADMIN — OLANZAPINE 10 MILLIGRAM(S): 15 TABLET, FILM COATED ORAL at 13:21

## 2019-05-22 RX ADMIN — Medication 2 MILLIGRAM(S): at 21:18

## 2019-05-22 RX ADMIN — Medication 1 DROP(S): at 21:18

## 2019-05-22 RX ADMIN — Medication 50 MILLIGRAM(S): at 10:33

## 2019-05-23 PROCEDURE — 99232 SBSQ HOSP IP/OBS MODERATE 35: CPT | Mod: GC

## 2019-05-23 RX ORDER — HALOPERIDOL DECANOATE 100 MG/ML
5 INJECTION INTRAMUSCULAR ONCE
Refills: 0 | Status: DISCONTINUED | OUTPATIENT
Start: 2019-05-23 | End: 2019-05-31

## 2019-05-23 RX ORDER — CLONAZEPAM 1 MG
2 TABLET ORAL THREE TIMES A DAY
Refills: 0 | Status: DISCONTINUED | OUTPATIENT
Start: 2019-05-23 | End: 2019-05-30

## 2019-05-23 RX ORDER — HALOPERIDOL DECANOATE 100 MG/ML
5 INJECTION INTRAMUSCULAR ONCE
Refills: 0 | Status: COMPLETED | OUTPATIENT
Start: 2019-05-23 | End: 2019-05-23

## 2019-05-23 RX ORDER — DIPHENHYDRAMINE HCL 50 MG
50 CAPSULE ORAL ONCE
Refills: 0 | Status: COMPLETED | OUTPATIENT
Start: 2019-05-23 | End: 2019-05-23

## 2019-05-23 RX ADMIN — Medication 2 MILLIGRAM(S): at 09:07

## 2019-05-23 RX ADMIN — DIVALPROEX SODIUM 1500 MILLIGRAM(S): 500 TABLET, DELAYED RELEASE ORAL at 20:38

## 2019-05-23 RX ADMIN — Medication 50 MILLIGRAM(S): at 09:26

## 2019-05-23 RX ADMIN — Medication 1 DROP(S): at 09:06

## 2019-05-23 RX ADMIN — Medication 3 MILLIGRAM(S): at 07:46

## 2019-05-23 RX ADMIN — HALOPERIDOL DECANOATE 5 MILLIGRAM(S): 100 INJECTION INTRAMUSCULAR at 16:21

## 2019-05-23 RX ADMIN — DIVALPROEX SODIUM 1500 MILLIGRAM(S): 500 TABLET, DELAYED RELEASE ORAL at 09:07

## 2019-05-23 RX ADMIN — Medication 1 DROP(S): at 12:58

## 2019-05-23 RX ADMIN — OLANZAPINE 10 MILLIGRAM(S): 15 TABLET, FILM COATED ORAL at 12:58

## 2019-05-23 RX ADMIN — OLANZAPINE 10 MILLIGRAM(S): 15 TABLET, FILM COATED ORAL at 09:07

## 2019-05-23 RX ADMIN — Medication 50 MILLIGRAM(S): at 07:45

## 2019-05-23 RX ADMIN — Medication 3 MILLIGRAM(S): at 09:36

## 2019-05-23 RX ADMIN — Medication 2 MILLIGRAM(S): at 20:38

## 2019-05-23 RX ADMIN — Medication 3 MILLIGRAM(S): at 16:21

## 2019-05-23 RX ADMIN — OLANZAPINE 20 MILLIGRAM(S): 15 TABLET, FILM COATED ORAL at 20:38

## 2019-05-23 RX ADMIN — Medication 2 MILLIGRAM(S): at 12:58

## 2019-05-23 RX ADMIN — Medication 50 MILLIGRAM(S): at 16:21

## 2019-05-23 RX ADMIN — LITHIUM CARBONATE 900 MILLIGRAM(S): 300 TABLET, EXTENDED RELEASE ORAL at 20:38

## 2019-05-24 LAB — LITHIUM SERPL-MCNC: 0.67 MMOL/L — SIGNIFICANT CHANGE UP (ref 0.6–1.2)

## 2019-05-24 PROCEDURE — 99232 SBSQ HOSP IP/OBS MODERATE 35: CPT | Mod: GC

## 2019-05-24 RX ORDER — CLONAZEPAM 1 MG
2 TABLET ORAL ONCE
Refills: 0 | Status: DISCONTINUED | OUTPATIENT
Start: 2019-05-24 | End: 2019-05-24

## 2019-05-24 RX ORDER — OLANZAPINE 15 MG/1
10 TABLET, FILM COATED ORAL ONCE
Refills: 0 | Status: COMPLETED | OUTPATIENT
Start: 2019-05-24 | End: 2019-05-24

## 2019-05-24 RX ORDER — LITHIUM CARBONATE 300 MG/1
1200 TABLET, EXTENDED RELEASE ORAL AT BEDTIME
Refills: 0 | Status: DISCONTINUED | OUTPATIENT
Start: 2019-05-24 | End: 2019-05-31

## 2019-05-24 RX ADMIN — Medication 3 MILLIGRAM(S): at 11:48

## 2019-05-24 RX ADMIN — Medication 2 MILLIGRAM(S): at 23:41

## 2019-05-24 RX ADMIN — Medication 2 MILLIGRAM(S): at 15:40

## 2019-05-24 RX ADMIN — DIVALPROEX SODIUM 1500 MILLIGRAM(S): 500 TABLET, DELAYED RELEASE ORAL at 08:04

## 2019-05-24 RX ADMIN — LITHIUM CARBONATE 1200 MILLIGRAM(S): 300 TABLET, EXTENDED RELEASE ORAL at 23:41

## 2019-05-24 RX ADMIN — Medication 50 MILLIGRAM(S): at 11:47

## 2019-05-24 RX ADMIN — OLANZAPINE 10 MILLIGRAM(S): 15 TABLET, FILM COATED ORAL at 11:48

## 2019-05-24 RX ADMIN — OLANZAPINE 10 MILLIGRAM(S): 15 TABLET, FILM COATED ORAL at 15:40

## 2019-05-24 RX ADMIN — DIVALPROEX SODIUM 1500 MILLIGRAM(S): 500 TABLET, DELAYED RELEASE ORAL at 23:41

## 2019-05-24 RX ADMIN — Medication 1 DROP(S): at 13:45

## 2019-05-24 RX ADMIN — Medication 2 MILLIGRAM(S): at 08:04

## 2019-05-24 RX ADMIN — Medication 1 DROP(S): at 08:04

## 2019-05-24 RX ADMIN — Medication 1 DROP(S): at 22:30

## 2019-05-24 RX ADMIN — OLANZAPINE 20 MILLIGRAM(S): 15 TABLET, FILM COATED ORAL at 23:41

## 2019-05-24 RX ADMIN — OLANZAPINE 10 MILLIGRAM(S): 15 TABLET, FILM COATED ORAL at 08:04

## 2019-05-25 RX ADMIN — LITHIUM CARBONATE 1200 MILLIGRAM(S): 300 TABLET, EXTENDED RELEASE ORAL at 19:57

## 2019-05-25 RX ADMIN — DIVALPROEX SODIUM 1500 MILLIGRAM(S): 500 TABLET, DELAYED RELEASE ORAL at 08:51

## 2019-05-25 RX ADMIN — DIVALPROEX SODIUM 1500 MILLIGRAM(S): 500 TABLET, DELAYED RELEASE ORAL at 19:57

## 2019-05-25 RX ADMIN — Medication 1 DROP(S): at 12:12

## 2019-05-25 RX ADMIN — Medication 2 MILLIGRAM(S): at 12:12

## 2019-05-25 RX ADMIN — Medication 2 MILLIGRAM(S): at 08:51

## 2019-05-25 RX ADMIN — OLANZAPINE 20 MILLIGRAM(S): 15 TABLET, FILM COATED ORAL at 19:57

## 2019-05-25 RX ADMIN — OLANZAPINE 10 MILLIGRAM(S): 15 TABLET, FILM COATED ORAL at 08:51

## 2019-05-25 RX ADMIN — Medication 1 DROP(S): at 08:51

## 2019-05-25 RX ADMIN — Medication 2 MILLIGRAM(S): at 19:57

## 2019-05-25 RX ADMIN — Medication 1 DROP(S): at 19:57

## 2019-05-25 RX ADMIN — OLANZAPINE 10 MILLIGRAM(S): 15 TABLET, FILM COATED ORAL at 12:12

## 2019-05-26 RX ADMIN — DIVALPROEX SODIUM 1500 MILLIGRAM(S): 500 TABLET, DELAYED RELEASE ORAL at 20:12

## 2019-05-26 RX ADMIN — OLANZAPINE 10 MILLIGRAM(S): 15 TABLET, FILM COATED ORAL at 13:19

## 2019-05-26 RX ADMIN — Medication 2 MILLIGRAM(S): at 09:01

## 2019-05-26 RX ADMIN — OLANZAPINE 10 MILLIGRAM(S): 15 TABLET, FILM COATED ORAL at 09:15

## 2019-05-26 RX ADMIN — LITHIUM CARBONATE 1200 MILLIGRAM(S): 300 TABLET, EXTENDED RELEASE ORAL at 20:12

## 2019-05-26 RX ADMIN — Medication 2 MILLIGRAM(S): at 13:55

## 2019-05-26 RX ADMIN — DIVALPROEX SODIUM 1500 MILLIGRAM(S): 500 TABLET, DELAYED RELEASE ORAL at 09:01

## 2019-05-26 RX ADMIN — Medication 1 DROP(S): at 09:01

## 2019-05-26 RX ADMIN — Medication 2 MILLIGRAM(S): at 20:12

## 2019-05-26 RX ADMIN — OLANZAPINE 20 MILLIGRAM(S): 15 TABLET, FILM COATED ORAL at 20:12

## 2019-05-26 RX ADMIN — Medication 1 DROP(S): at 20:11

## 2019-05-26 RX ADMIN — Medication 1 DROP(S): at 13:55

## 2019-05-27 RX ADMIN — Medication 1 DROP(S): at 08:28

## 2019-05-27 RX ADMIN — OLANZAPINE 20 MILLIGRAM(S): 15 TABLET, FILM COATED ORAL at 19:48

## 2019-05-27 RX ADMIN — LITHIUM CARBONATE 1200 MILLIGRAM(S): 300 TABLET, EXTENDED RELEASE ORAL at 19:47

## 2019-05-27 RX ADMIN — DIVALPROEX SODIUM 1500 MILLIGRAM(S): 500 TABLET, DELAYED RELEASE ORAL at 08:28

## 2019-05-27 RX ADMIN — OLANZAPINE 10 MILLIGRAM(S): 15 TABLET, FILM COATED ORAL at 08:28

## 2019-05-27 RX ADMIN — Medication 2 MILLIGRAM(S): at 08:28

## 2019-05-27 RX ADMIN — Medication 1 DROP(S): at 19:47

## 2019-05-27 RX ADMIN — Medication 1 DROP(S): at 20:40

## 2019-05-27 RX ADMIN — DIVALPROEX SODIUM 1500 MILLIGRAM(S): 500 TABLET, DELAYED RELEASE ORAL at 19:47

## 2019-05-27 RX ADMIN — Medication 2 MILLIGRAM(S): at 13:50

## 2019-05-27 RX ADMIN — Medication 1 DROP(S): at 13:50

## 2019-05-27 RX ADMIN — Medication 2 MILLIGRAM(S): at 19:47

## 2019-05-27 RX ADMIN — OLANZAPINE 10 MILLIGRAM(S): 15 TABLET, FILM COATED ORAL at 13:50

## 2019-05-28 PROCEDURE — 99232 SBSQ HOSP IP/OBS MODERATE 35: CPT | Mod: GC

## 2019-05-28 RX ADMIN — OLANZAPINE 20 MILLIGRAM(S): 15 TABLET, FILM COATED ORAL at 19:58

## 2019-05-28 RX ADMIN — Medication 2 MILLIGRAM(S): at 12:01

## 2019-05-28 RX ADMIN — OLANZAPINE 10 MILLIGRAM(S): 15 TABLET, FILM COATED ORAL at 12:14

## 2019-05-28 RX ADMIN — Medication 1 DROP(S): at 19:58

## 2019-05-28 RX ADMIN — OLANZAPINE 10 MILLIGRAM(S): 15 TABLET, FILM COATED ORAL at 09:43

## 2019-05-28 RX ADMIN — Medication 2 MILLIGRAM(S): at 09:43

## 2019-05-28 RX ADMIN — Medication 1 DROP(S): at 11:55

## 2019-05-28 RX ADMIN — LITHIUM CARBONATE 1200 MILLIGRAM(S): 300 TABLET, EXTENDED RELEASE ORAL at 19:58

## 2019-05-28 RX ADMIN — Medication 2 MILLIGRAM(S): at 19:58

## 2019-05-28 RX ADMIN — DIVALPROEX SODIUM 1500 MILLIGRAM(S): 500 TABLET, DELAYED RELEASE ORAL at 19:58

## 2019-05-28 RX ADMIN — DIVALPROEX SODIUM 1500 MILLIGRAM(S): 500 TABLET, DELAYED RELEASE ORAL at 09:43

## 2019-05-29 LAB
ALBUMIN SERPL ELPH-MCNC: 4.4 G/DL — SIGNIFICANT CHANGE UP (ref 3.3–5)
ALP SERPL-CCNC: 79 U/L — SIGNIFICANT CHANGE UP (ref 40–120)
ALT FLD-CCNC: 25 U/L — SIGNIFICANT CHANGE UP (ref 4–41)
ANION GAP SERPL CALC-SCNC: 17 MMO/L — HIGH (ref 7–14)
AST SERPL-CCNC: 32 U/L — SIGNIFICANT CHANGE UP (ref 4–40)
BASOPHILS # BLD AUTO: 0.05 K/UL — SIGNIFICANT CHANGE UP (ref 0–0.2)
BASOPHILS NFR BLD AUTO: 0.3 % — SIGNIFICANT CHANGE UP (ref 0–2)
BILIRUB SERPL-MCNC: 0.3 MG/DL — SIGNIFICANT CHANGE UP (ref 0.2–1.2)
BUN SERPL-MCNC: 21 MG/DL — SIGNIFICANT CHANGE UP (ref 7–23)
CALCIUM SERPL-MCNC: 9.9 MG/DL — SIGNIFICANT CHANGE UP (ref 8.4–10.5)
CHLORIDE SERPL-SCNC: 99 MMOL/L — SIGNIFICANT CHANGE UP (ref 98–107)
CK SERPL-CCNC: 914 U/L — HIGH (ref 30–200)
CO2 SERPL-SCNC: 18 MMOL/L — LOW (ref 22–31)
CREAT SERPL-MCNC: 0.82 MG/DL — SIGNIFICANT CHANGE UP (ref 0.5–1.3)
EOSINOPHIL # BLD AUTO: 1.14 K/UL — HIGH (ref 0–0.5)
EOSINOPHIL NFR BLD AUTO: 7.7 % — HIGH (ref 0–6)
GLUCOSE SERPL-MCNC: 96 MG/DL — SIGNIFICANT CHANGE UP (ref 70–99)
HCT VFR BLD CALC: 45.5 % — SIGNIFICANT CHANGE UP (ref 39–50)
HCT VFR BLD CALC: 45.5 % — SIGNIFICANT CHANGE UP (ref 39–50)
HGB BLD-MCNC: 14.8 G/DL — SIGNIFICANT CHANGE UP (ref 13–17)
HGB BLD-MCNC: 14.8 G/DL — SIGNIFICANT CHANGE UP (ref 13–17)
IMM GRANULOCYTES NFR BLD AUTO: 0.7 % — SIGNIFICANT CHANGE UP (ref 0–1.5)
LITHIUM SERPL-MCNC: 0.77 MMOL/L — SIGNIFICANT CHANGE UP (ref 0.6–1.2)
LYMPHOCYTES # BLD AUTO: 17.9 % — SIGNIFICANT CHANGE UP (ref 13–44)
LYMPHOCYTES # BLD AUTO: 2.64 K/UL — SIGNIFICANT CHANGE UP (ref 1–3.3)
MAGNESIUM SERPL-MCNC: 2.1 MG/DL — SIGNIFICANT CHANGE UP (ref 1.6–2.6)
MCHC RBC-ENTMCNC: 29.8 PG — SIGNIFICANT CHANGE UP (ref 27–34)
MCHC RBC-ENTMCNC: 29.8 PG — SIGNIFICANT CHANGE UP (ref 27–34)
MCHC RBC-ENTMCNC: 32.5 % — SIGNIFICANT CHANGE UP (ref 32–36)
MCHC RBC-ENTMCNC: 32.5 % — SIGNIFICANT CHANGE UP (ref 32–36)
MCV RBC AUTO: 91.5 FL — SIGNIFICANT CHANGE UP (ref 80–100)
MCV RBC AUTO: 91.5 FL — SIGNIFICANT CHANGE UP (ref 80–100)
MONOCYTES # BLD AUTO: 2.05 K/UL — HIGH (ref 0–0.9)
MONOCYTES NFR BLD AUTO: 13.9 % — SIGNIFICANT CHANGE UP (ref 2–14)
NEUTROPHILS # BLD AUTO: 8.75 K/UL — HIGH (ref 1.8–7.4)
NEUTROPHILS NFR BLD AUTO: 59.5 % — SIGNIFICANT CHANGE UP (ref 43–77)
NRBC # FLD: 0 K/UL — SIGNIFICANT CHANGE UP (ref 0–0)
NRBC # FLD: 0 K/UL — SIGNIFICANT CHANGE UP (ref 0–0)
PHOSPHATE SERPL-MCNC: 4 MG/DL — SIGNIFICANT CHANGE UP (ref 2.5–4.5)
PLATELET # BLD AUTO: 271 K/UL — SIGNIFICANT CHANGE UP (ref 150–400)
PLATELET # BLD AUTO: 271 K/UL — SIGNIFICANT CHANGE UP (ref 150–400)
PMV BLD: 10 FL — SIGNIFICANT CHANGE UP (ref 7–13)
PMV BLD: 10 FL — SIGNIFICANT CHANGE UP (ref 7–13)
POTASSIUM SERPL-MCNC: 4.6 MMOL/L — SIGNIFICANT CHANGE UP (ref 3.5–5.3)
POTASSIUM SERPL-SCNC: 4.6 MMOL/L — SIGNIFICANT CHANGE UP (ref 3.5–5.3)
PROT SERPL-MCNC: 8 G/DL — SIGNIFICANT CHANGE UP (ref 6–8.3)
RBC # BLD: 4.97 M/UL — SIGNIFICANT CHANGE UP (ref 4.2–5.8)
RBC # BLD: 4.97 M/UL — SIGNIFICANT CHANGE UP (ref 4.2–5.8)
RBC # FLD: 13.6 % — SIGNIFICANT CHANGE UP (ref 10.3–14.5)
RBC # FLD: 13.6 % — SIGNIFICANT CHANGE UP (ref 10.3–14.5)
SODIUM SERPL-SCNC: 134 MMOL/L — LOW (ref 135–145)
WBC # BLD: 14.33 K/UL — HIGH (ref 3.8–10.5)
WBC # BLD: 14.33 K/UL — HIGH (ref 3.8–10.5)
WBC # FLD AUTO: 14.33 K/UL — HIGH (ref 3.8–10.5)
WBC # FLD AUTO: 14.33 K/UL — HIGH (ref 3.8–10.5)

## 2019-05-29 PROCEDURE — 99232 SBSQ HOSP IP/OBS MODERATE 35: CPT | Mod: GC

## 2019-05-29 RX ORDER — TUBERCULIN PURIFIED PROTEIN DERIVATIVE 5 [IU]/.1ML
5 INJECTION, SOLUTION INTRADERMAL ONCE
Refills: 0 | Status: COMPLETED | OUTPATIENT
Start: 2019-05-29 | End: 2019-05-29

## 2019-05-29 RX ADMIN — Medication 2 MILLIGRAM(S): at 21:11

## 2019-05-29 RX ADMIN — OLANZAPINE 10 MILLIGRAM(S): 15 TABLET, FILM COATED ORAL at 09:01

## 2019-05-29 RX ADMIN — OLANZAPINE 10 MILLIGRAM(S): 15 TABLET, FILM COATED ORAL at 13:24

## 2019-05-29 RX ADMIN — Medication 1 DROP(S): at 21:58

## 2019-05-29 RX ADMIN — DIVALPROEX SODIUM 1500 MILLIGRAM(S): 500 TABLET, DELAYED RELEASE ORAL at 09:01

## 2019-05-29 RX ADMIN — Medication 2 MILLIGRAM(S): at 09:01

## 2019-05-29 RX ADMIN — Medication 1 DROP(S): at 13:24

## 2019-05-29 RX ADMIN — Medication 1 DROP(S): at 09:01

## 2019-05-29 RX ADMIN — LITHIUM CARBONATE 1200 MILLIGRAM(S): 300 TABLET, EXTENDED RELEASE ORAL at 21:11

## 2019-05-29 RX ADMIN — DIVALPROEX SODIUM 1500 MILLIGRAM(S): 500 TABLET, DELAYED RELEASE ORAL at 21:11

## 2019-05-29 RX ADMIN — Medication 2 MILLIGRAM(S): at 13:24

## 2019-05-29 RX ADMIN — OLANZAPINE 20 MILLIGRAM(S): 15 TABLET, FILM COATED ORAL at 21:11

## 2019-05-29 RX ADMIN — TUBERCULIN PURIFIED PROTEIN DERIVATIVE 5 UNIT(S): 5 INJECTION, SOLUTION INTRADERMAL at 18:32

## 2019-05-29 RX ADMIN — Medication 1 DROP(S): at 21:11

## 2019-05-30 PROCEDURE — 99232 SBSQ HOSP IP/OBS MODERATE 35: CPT

## 2019-05-30 RX ORDER — LITHIUM CARBONATE 300 MG/1
2 TABLET, EXTENDED RELEASE ORAL
Qty: 28 | Refills: 0
Start: 2019-05-30 | End: 2019-06-12

## 2019-05-30 RX ORDER — CLONAZEPAM 1 MG
1 TABLET ORAL
Qty: 28 | Refills: 0
Start: 2019-05-30 | End: 2019-06-12

## 2019-05-30 RX ORDER — OLANZAPINE 15 MG/1
2 TABLET, FILM COATED ORAL
Qty: 28 | Refills: 0
Start: 2019-05-30 | End: 2019-06-12

## 2019-05-30 RX ORDER — OLANZAPINE 15 MG/1
1 TABLET, FILM COATED ORAL
Qty: 14 | Refills: 0
Start: 2019-05-30 | End: 2019-06-12

## 2019-05-30 RX ORDER — OLANZAPINE 15 MG/1
30 TABLET, FILM COATED ORAL AT BEDTIME
Refills: 0 | Status: DISCONTINUED | OUTPATIENT
Start: 2019-05-30 | End: 2019-05-31

## 2019-05-30 RX ORDER — CLONAZEPAM 1 MG
2 TABLET ORAL
Refills: 0 | Status: DISCONTINUED | OUTPATIENT
Start: 2019-05-30 | End: 2019-05-31

## 2019-05-30 RX ORDER — DIVALPROEX SODIUM 500 MG/1
3 TABLET, DELAYED RELEASE ORAL
Qty: 84 | Refills: 0
Start: 2019-05-30 | End: 2019-06-12

## 2019-05-30 RX ORDER — OLANZAPINE 15 MG/1
10 TABLET, FILM COATED ORAL DAILY
Refills: 0 | Status: DISCONTINUED | OUTPATIENT
Start: 2019-05-30 | End: 2019-05-31

## 2019-05-30 RX ADMIN — OLANZAPINE 10 MILLIGRAM(S): 15 TABLET, FILM COATED ORAL at 08:12

## 2019-05-30 RX ADMIN — DIVALPROEX SODIUM 1500 MILLIGRAM(S): 500 TABLET, DELAYED RELEASE ORAL at 20:05

## 2019-05-30 RX ADMIN — DIVALPROEX SODIUM 1500 MILLIGRAM(S): 500 TABLET, DELAYED RELEASE ORAL at 08:12

## 2019-05-30 RX ADMIN — Medication 2 MILLIGRAM(S): at 20:05

## 2019-05-30 RX ADMIN — LITHIUM CARBONATE 1200 MILLIGRAM(S): 300 TABLET, EXTENDED RELEASE ORAL at 20:04

## 2019-05-30 RX ADMIN — OLANZAPINE 30 MILLIGRAM(S): 15 TABLET, FILM COATED ORAL at 20:04

## 2019-05-30 RX ADMIN — Medication 2 MILLIGRAM(S): at 08:12

## 2019-05-31 VITALS — TEMPERATURE: 98 F

## 2019-05-31 LAB
ALBUMIN SERPL ELPH-MCNC: 4.4 G/DL — SIGNIFICANT CHANGE UP (ref 3.3–5)
ALP SERPL-CCNC: 74 U/L — SIGNIFICANT CHANGE UP (ref 40–120)
ALT FLD-CCNC: 24 U/L — SIGNIFICANT CHANGE UP (ref 4–41)
ANION GAP SERPL CALC-SCNC: 13 MMO/L — SIGNIFICANT CHANGE UP (ref 7–14)
AST SERPL-CCNC: 35 U/L — SIGNIFICANT CHANGE UP (ref 4–40)
BILIRUB SERPL-MCNC: 0.3 MG/DL — SIGNIFICANT CHANGE UP (ref 0.2–1.2)
BUN SERPL-MCNC: 19 MG/DL — SIGNIFICANT CHANGE UP (ref 7–23)
CALCIUM SERPL-MCNC: 9.5 MG/DL — SIGNIFICANT CHANGE UP (ref 8.4–10.5)
CHLORIDE SERPL-SCNC: 98 MMOL/L — SIGNIFICANT CHANGE UP (ref 98–107)
CK SERPL-CCNC: 817 U/L — HIGH (ref 30–200)
CO2 SERPL-SCNC: 25 MMOL/L — SIGNIFICANT CHANGE UP (ref 22–31)
CREAT SERPL-MCNC: 0.9 MG/DL — SIGNIFICANT CHANGE UP (ref 0.5–1.3)
GLUCOSE SERPL-MCNC: 115 MG/DL — HIGH (ref 70–99)
LITHIUM SERPL-MCNC: 0.78 MMOL/L — SIGNIFICANT CHANGE UP (ref 0.6–1.2)
POTASSIUM SERPL-MCNC: 4 MMOL/L — SIGNIFICANT CHANGE UP (ref 3.5–5.3)
POTASSIUM SERPL-SCNC: 4 MMOL/L — SIGNIFICANT CHANGE UP (ref 3.5–5.3)
PROT SERPL-MCNC: 7.6 G/DL — SIGNIFICANT CHANGE UP (ref 6–8.3)
SODIUM SERPL-SCNC: 136 MMOL/L — SIGNIFICANT CHANGE UP (ref 135–145)

## 2019-05-31 PROCEDURE — 99239 HOSP IP/OBS DSCHRG MGMT >30: CPT | Mod: GC

## 2019-05-31 RX ADMIN — Medication 2 MILLIGRAM(S): at 08:10

## 2019-05-31 RX ADMIN — OLANZAPINE 10 MILLIGRAM(S): 15 TABLET, FILM COATED ORAL at 08:10

## 2019-05-31 RX ADMIN — DIVALPROEX SODIUM 1500 MILLIGRAM(S): 500 TABLET, DELAYED RELEASE ORAL at 08:10

## 2019-05-31 RX ADMIN — Medication 1 DROP(S): at 08:10

## 2020-01-06 NOTE — ED ADULT NURSE NOTE - PRIMARY CARE PROVIDER
Septic shock most likely secondary to strep pneumonia in the setting of immunocompromise requiring pressors--resolved  Strep antigen positive  1/3/2020 1/2 blood cultures positive for staphylococcal coagulase negative, most likely contaminant  Repeat blood cultures 1/5 negative so far  Patient was on vancomycin and ceftriaxone  Antibiotic day 5  Today he was switched to Ceftin by pulmonology  Patient is on chronic steroids due to COPD, he was on hydrocortisone for possible adrenal insufficiency and hypotension  His hemodynamics improved and he was restarted on prednisone 5 mg daily  unk

## 2020-07-07 ENCOUNTER — OUTPATIENT (OUTPATIENT)
Dept: OUTPATIENT SERVICES | Facility: HOSPITAL | Age: 25
LOS: 1 days | End: 2020-07-07
Payer: COMMERCIAL

## 2020-07-07 DIAGNOSIS — F20.9 SCHIZOPHRENIA, UNSPECIFIED: ICD-10-CM

## 2020-07-07 DIAGNOSIS — U07.1 COVID-19: ICD-10-CM

## 2022-11-20 NOTE — ED ADULT NURSE NOTE - NSFALLRSKHARMRISK_ED_ALL_ED
Left message with urine culture result  Advised she can stop the antibiotic  Left clinic number for questions no

## 2023-10-08 ENCOUNTER — EMERGENCY (EMERGENCY)
Facility: HOSPITAL | Age: 28
LOS: 1 days | Discharge: ANOTHER HOSPITAL | End: 2023-10-08
Admitting: STUDENT IN AN ORGANIZED HEALTH CARE EDUCATION/TRAINING PROGRAM
Payer: MEDICAID

## 2023-10-08 VITALS
RESPIRATION RATE: 16 BRPM | DIASTOLIC BLOOD PRESSURE: 92 MMHG | HEART RATE: 112 BPM | OXYGEN SATURATION: 99 % | TEMPERATURE: 98 F | SYSTOLIC BLOOD PRESSURE: 140 MMHG

## 2023-10-08 DIAGNOSIS — F25.9 SCHIZOAFFECTIVE DISORDER, UNSPECIFIED: ICD-10-CM

## 2023-10-08 LAB
ALBUMIN SERPL ELPH-MCNC: 4.6 G/DL — SIGNIFICANT CHANGE UP (ref 3.3–5)
ALP SERPL-CCNC: 87 U/L — SIGNIFICANT CHANGE UP (ref 40–120)
ALT FLD-CCNC: 22 U/L — SIGNIFICANT CHANGE UP (ref 4–41)
AMPHET UR-MCNC: NEGATIVE — SIGNIFICANT CHANGE UP
ANION GAP SERPL CALC-SCNC: 15 MMOL/L — HIGH (ref 7–14)
APAP SERPL-MCNC: <10 UG/ML — LOW (ref 15–25)
APPEARANCE UR: CLEAR — SIGNIFICANT CHANGE UP
AST SERPL-CCNC: 25 U/L — SIGNIFICANT CHANGE UP (ref 4–40)
BARBITURATES UR SCN-MCNC: NEGATIVE — SIGNIFICANT CHANGE UP
BASOPHILS # BLD AUTO: 0.06 K/UL — SIGNIFICANT CHANGE UP (ref 0–0.2)
BASOPHILS NFR BLD AUTO: 0.5 % — SIGNIFICANT CHANGE UP (ref 0–2)
BENZODIAZ UR-MCNC: NEGATIVE — SIGNIFICANT CHANGE UP
BILIRUB SERPL-MCNC: 0.3 MG/DL — SIGNIFICANT CHANGE UP (ref 0.2–1.2)
BILIRUB UR-MCNC: NEGATIVE — SIGNIFICANT CHANGE UP
BUN SERPL-MCNC: 11 MG/DL — SIGNIFICANT CHANGE UP (ref 7–23)
CALCIUM SERPL-MCNC: 9.8 MG/DL — SIGNIFICANT CHANGE UP (ref 8.4–10.5)
CHLORIDE SERPL-SCNC: 102 MMOL/L — SIGNIFICANT CHANGE UP (ref 98–107)
CK SERPL-CCNC: 595 U/L — HIGH (ref 30–200)
CK SERPL-CCNC: 764 U/L — HIGH (ref 30–200)
CO2 SERPL-SCNC: 19 MMOL/L — LOW (ref 22–31)
COCAINE METAB.OTHER UR-MCNC: NEGATIVE — SIGNIFICANT CHANGE UP
COLOR SPEC: YELLOW — SIGNIFICANT CHANGE UP
CREAT SERPL-MCNC: 0.76 MG/DL — SIGNIFICANT CHANGE UP (ref 0.5–1.3)
CREATININE URINE RESULT, DAU: 27 MG/DL — SIGNIFICANT CHANGE UP
DIFF PNL FLD: NEGATIVE — SIGNIFICANT CHANGE UP
EGFR: 127 ML/MIN/1.73M2 — SIGNIFICANT CHANGE UP
EOSINOPHIL # BLD AUTO: 0.35 K/UL — SIGNIFICANT CHANGE UP (ref 0–0.5)
EOSINOPHIL NFR BLD AUTO: 2.9 % — SIGNIFICANT CHANGE UP (ref 0–6)
ETHANOL SERPL-MCNC: <10 MG/DL — SIGNIFICANT CHANGE UP
GLUCOSE SERPL-MCNC: 154 MG/DL — HIGH (ref 70–99)
GLUCOSE UR QL: NEGATIVE MG/DL — SIGNIFICANT CHANGE UP
HCT VFR BLD CALC: 44 % — SIGNIFICANT CHANGE UP (ref 39–50)
HGB BLD-MCNC: 14.9 G/DL — SIGNIFICANT CHANGE UP (ref 13–17)
IANC: 8.7 K/UL — HIGH (ref 1.8–7.4)
IMM GRANULOCYTES NFR BLD AUTO: 0.4 % — SIGNIFICANT CHANGE UP (ref 0–0.9)
KETONES UR-MCNC: NEGATIVE MG/DL — SIGNIFICANT CHANGE UP
LEUKOCYTE ESTERASE UR-ACNC: NEGATIVE — SIGNIFICANT CHANGE UP
LITHIUM SERPL-MCNC: 1.1 MMOL/L — SIGNIFICANT CHANGE UP (ref 0.6–1.2)
LYMPHOCYTES # BLD AUTO: 17.9 % — SIGNIFICANT CHANGE UP (ref 13–44)
LYMPHOCYTES # BLD AUTO: 2.18 K/UL — SIGNIFICANT CHANGE UP (ref 1–3.3)
MCHC RBC-ENTMCNC: 29.9 PG — SIGNIFICANT CHANGE UP (ref 27–34)
MCHC RBC-ENTMCNC: 33.9 GM/DL — SIGNIFICANT CHANGE UP (ref 32–36)
MCV RBC AUTO: 88.2 FL — SIGNIFICANT CHANGE UP (ref 80–100)
METHADONE UR-MCNC: NEGATIVE — SIGNIFICANT CHANGE UP
MONOCYTES # BLD AUTO: 0.87 K/UL — SIGNIFICANT CHANGE UP (ref 0–0.9)
MONOCYTES NFR BLD AUTO: 7.1 % — SIGNIFICANT CHANGE UP (ref 2–14)
NEUTROPHILS # BLD AUTO: 8.7 K/UL — HIGH (ref 1.8–7.4)
NEUTROPHILS NFR BLD AUTO: 71.2 % — SIGNIFICANT CHANGE UP (ref 43–77)
NITRITE UR-MCNC: NEGATIVE — SIGNIFICANT CHANGE UP
NRBC # BLD: 0 /100 WBCS — SIGNIFICANT CHANGE UP (ref 0–0)
NRBC # FLD: 0 K/UL — SIGNIFICANT CHANGE UP (ref 0–0)
OPIATES UR-MCNC: NEGATIVE — SIGNIFICANT CHANGE UP
OXYCODONE UR-MCNC: NEGATIVE — SIGNIFICANT CHANGE UP
PCP SPEC-MCNC: SIGNIFICANT CHANGE UP
PCP UR-MCNC: NEGATIVE — SIGNIFICANT CHANGE UP
PH UR: 8 — SIGNIFICANT CHANGE UP (ref 5–8)
PLATELET # BLD AUTO: 329 K/UL — SIGNIFICANT CHANGE UP (ref 150–400)
POTASSIUM SERPL-MCNC: 3.7 MMOL/L — SIGNIFICANT CHANGE UP (ref 3.5–5.3)
POTASSIUM SERPL-SCNC: 3.7 MMOL/L — SIGNIFICANT CHANGE UP (ref 3.5–5.3)
PROT SERPL-MCNC: 8 G/DL — SIGNIFICANT CHANGE UP (ref 6–8.3)
PROT UR-MCNC: NEGATIVE MG/DL — SIGNIFICANT CHANGE UP
RBC # BLD: 4.99 M/UL — SIGNIFICANT CHANGE UP (ref 4.2–5.8)
RBC # FLD: 12.8 % — SIGNIFICANT CHANGE UP (ref 10.3–14.5)
SALICYLATES SERPL-MCNC: <0.3 MG/DL — LOW (ref 15–30)
SARS-COV-2 RNA SPEC QL NAA+PROBE: SIGNIFICANT CHANGE UP
SODIUM SERPL-SCNC: 136 MMOL/L — SIGNIFICANT CHANGE UP (ref 135–145)
SP GR SPEC: 1.01 — SIGNIFICANT CHANGE UP (ref 1–1.03)
THC UR QL: NEGATIVE — SIGNIFICANT CHANGE UP
TOXICOLOGY SCREEN, DRUGS OF ABUSE, SERUM RESULT: SIGNIFICANT CHANGE UP
TSH SERPL-MCNC: 5.88 UIU/ML — HIGH (ref 0.27–4.2)
UROBILINOGEN FLD QL: 0.2 MG/DL — SIGNIFICANT CHANGE UP (ref 0.2–1)
VALPROATE SERPL-MCNC: <3.15 UG/ML — LOW (ref 50–100)
WBC # BLD: 12.21 K/UL — HIGH (ref 3.8–10.5)
WBC # FLD AUTO: 12.21 K/UL — HIGH (ref 3.8–10.5)

## 2023-10-08 PROCEDURE — 99285 EMERGENCY DEPT VISIT HI MDM: CPT

## 2023-10-08 PROCEDURE — 93010 ELECTROCARDIOGRAM REPORT: CPT

## 2023-10-08 RX ORDER — TRAZODONE HCL 50 MG
50 TABLET ORAL ONCE
Refills: 0 | Status: DISCONTINUED | OUTPATIENT
Start: 2023-10-08 | End: 2023-10-08

## 2023-10-08 RX ORDER — LITHIUM CARBONATE 300 MG/1
600 TABLET, EXTENDED RELEASE ORAL ONCE
Refills: 0 | Status: COMPLETED | OUTPATIENT
Start: 2023-10-08 | End: 2023-10-08

## 2023-10-08 RX ORDER — DIPHENHYDRAMINE HCL 50 MG
50 CAPSULE ORAL ONCE
Refills: 0 | Status: COMPLETED | OUTPATIENT
Start: 2023-10-08 | End: 2023-10-08

## 2023-10-08 RX ORDER — FOLIC ACID 0.8 MG
1 TABLET ORAL DAILY
Refills: 0 | Status: DISCONTINUED | OUTPATIENT
Start: 2023-10-08 | End: 2023-10-11

## 2023-10-08 RX ORDER — OLANZAPINE 15 MG/1
20 TABLET, FILM COATED ORAL ONCE
Refills: 0 | Status: COMPLETED | OUTPATIENT
Start: 2023-10-08 | End: 2023-10-08

## 2023-10-08 RX ADMIN — Medication 50 MILLIGRAM(S): at 23:09

## 2023-10-08 RX ADMIN — Medication 2 MILLIGRAM(S): at 13:24

## 2023-10-08 RX ADMIN — OLANZAPINE 20 MILLIGRAM(S): 15 TABLET, FILM COATED ORAL at 16:19

## 2023-10-08 RX ADMIN — Medication 1 MILLIGRAM(S): at 16:19

## 2023-10-08 NOTE — ED BEHAVIORAL HEALTH ASSESSMENT NOTE - PAST PSYCHOTROPIC MEDICATION
Risperdal - concern for NMS, Haldol, zyprexa - elevated CK/concern for NMS  thorazine - elevated CK/concern for NMS, Lithium, Clozapine - only able to titrate to 50mg TID due to tachycardia, prolixin

## 2023-10-08 NOTE — ED BEHAVIORAL HEALTH ASSESSMENT NOTE - HPI (INCLUDE ILLNESS QUALITY, SEVERITY, DURATION, TIMING, CONTEXT, MODIFYING FACTORS, ASSOCIATED SIGNS AND SYMPTOMS)
Patient is a 27yo M, single, unemployed, noncaregiver, domiciled with step-father who is currently out of the country, with PPHx of documented Schizoaffective, bipolar type, at least 5 past hospitalizations last at [facility] last dc at Oct 1st, no known hx of suicidality or violence, no previous hx of substance use but pt stating recent frequent use of alcohol, no relevant PMHx who presents to the ED after recent discharge from [facility] ED for anxiety and worsening of functioning.    On evaluation, patient is AOx3, tangential with flight of ideas, difficulty sitting still during interview, choosing to stand intermittently. Patient is difficult to follow, requiring frequent interruptions and redirection to establish events leading up to presentation to the ED and gather symptoms. Per patient, he is currently living in [place] with his step-father who is out of town. He endorses not sleeping well over the last week, at most three hours per night. States that he has been drinking more alcohol this week, up to fifteen drinks per day, has been feeling anxious, overall making decisions that are not like himself. Patient is also religiously preoccupied, stating that his family is both from [place], talking about the war but then states that he is not Lutheran, speaks about Zoroastrianism. States that he went to [facility] ED last night attempting to be admitted but was discharged then brought himself to [facility].    Patient reports mood is "horrible" endorsing active SI with thoughts to jump out of his third story window. Pt also endorses thinking that he thinks people want to harm him and that he is worried that he could hurt someone else although denies HI. Denies ever normally feeling in danger or previous violence episodes. Denies AVH. Denies OCD symptoms. Denies hx of psychotic symptoms, but per documentation carries schizoaffective diagnosis. Reports taking medications as prescribed including lithium, Depakote, and Zyprexa.     See  chart note for collateral. Johana Faulkner)  2021 23:13:08

## 2023-10-08 NOTE — ED BEHAVIORAL HEALTH ASSESSMENT NOTE - NSBHATTESTCOMMENTATTENDFT_PSY_A_CORE
28/M with reported hx of SAD, has multiple psych admissions, no reported hx of SA and currently claims alcohol use. today, self presented to the ED complaining of worsening anxiety. Psych was consulted for evaluation of SI    at this time, endorses feeling increasingly anxious, is agitated. of concern is Pt been harboring SI with lethal plans or either jumping from a bldg vs stabbing self.  at this time, is severely affectively dysregulated and continues to be unpredictable - given lability; he is illogical in TP; with chronically poor insight and impaired judgement. likely decompensation thought to be in the context of poor/ non compliance with psych meds (VPA level at < 3.15) compounded by suspected illicit substance use.   ongoing symptoms causing severe functional impairment. he currently requests psych admission aimed at stabilization and ensuring safety     RECOMMENDATIONS:  1. FOR NOW, WILL continue lithium 600mg daily and Zyprexa 20mg daily. titrate as needed.   2. will hold off on VPA   3. for now, to temporarily board here at the  ED as no beds are available  - discussed with  ED team 28/M with reported hx of SAD, has multiple psych admissions, no reported hx of SA and currently claims alcohol use. today, self presented to the ED complaining of worsening anxiety. Psych was consulted for evaluation of SI    at this time, endorses feeling increasingly anxious, is agitated. of concern is Pt been harboring SI with lethal plans or either jumping from a bldg vs stabbing self.  at this time, is severely affectively dysregulated and continues to be unpredictable - given lability; he is illogical in TP; with chronically poor insight and impaired judgement. currently not manifesting any signs/ symptoms suggestive of impending withdrawal.  He is not delirious.  Likely decompensation thought to be in the context of poor/ non compliance with psych meds (VPA level at < 3.15) compounded by suspected illicit substance use.   ongoing symptoms causing severe functional impairment. he currently requests psych admission aimed at stabilization and ensuring safety     RECOMMENDATIONS:  1. FOR NOW, WILL continue lithium 600mg daily and Zyprexa 20mg daily. titrate as needed.   2. will hold off on VPA   3. for now, to temporarily board here at the  ED as no beds are available  - discussed with  ED team

## 2023-10-08 NOTE — ED PROVIDER NOTE - PROGRESS NOTE DETAILS
Received patient on signout from RIVER Leyva patient boarding to be admitted for suicidal ideations with plan.  Asked to follow-up and repeat CPK went from 7 [id number] 95 trending down.  Normal kidney function.  Will sign out to night team at end of shift Pt signed out to me, clinically stable boarding for admission for SI. No acute issues overnight. Maximiliano Jimenez, ED Attending Dr. Dangelo: Pt signed out to me by Dr. Jimenez. No acute overnight events. Pt boarding for bed. Received phone call from CAPNIA, requesting 20mg zyprexa po. ZAKI Elizalde: pt signed out to me boarding for admission for SI w/ plan. pt medically cleared. will transfer to [facility] to Dr. Jackson.

## 2023-10-08 NOTE — ED BEHAVIORAL HEALTH ASSESSMENT NOTE - NSBHMSETHTCONTENT_PSY_A_CORE
Preoccupations/Suicidality Terbinafine Pregnancy And Lactation Text: This medication is Pregnancy Category B and is considered safe during pregnancy. It is also excreted in breast milk and breast feeding isn't recommended.

## 2023-10-08 NOTE — ED BEHAVIORAL HEALTH NOTE - BEHAVIORAL HEALTH NOTE
Writer received call from Alicia Martinez ([phone]):    States knowing Jr since he was 16 years old. Has been a mentor for him seeing him weekly and speaking with providers and his step-father throughout knowing him. States that pt came to us following [place] and experienced trauma and likely PTSD from the event. Also states that he suffers from tremendous anxiety that she feels remains untreated. States that patient has had a relatively rapid decompensation after the step-father left the home believing Jr could be independent. States she noticed his hygiene first going down in addition to noticing him be much "quicker" with his thought process than normal. States that spent all the money his step-father left him rather quickly and has accrued personal debt from various people trying to maintain rent / nutrition. Feels that there has been some substance use as a catalyst for some of what is going on, which she describes as coming from the fact that he has been joining community members in Confucianist celebrations that involve drinking alcohol socially. Though feels that he is not someone who is habitually on substances. She is advocating for admission as she feels that he is getting progressively worse and having difficulty fending for himself financially. She does not know of any safety concerns from what he has said to her, but that in the past, there had been concerns around this which has resulted in hospitalization. States patient is on lithium and other medications that she worries could interact with ETOH or other substances.

## 2023-10-08 NOTE — ED BEHAVIORAL HEALTH ASSESSMENT NOTE - OTHER PAST PSYCHIATRIC HISTORY (INCLUDE DETAILS REGARDING ONSET, COURSE OF ILLNESS, INPATIENT/OUTPATIENT TREATMENT)
At least 5 prior hospitalizations  first at [facility] 2014  last at [facility] October 2023

## 2023-10-08 NOTE — ED ADULT NURSE NOTE - OBJECTIVE STATEMENT
Received pt in  pt calm c/o Received pt in  pt disorganized  c/o depression & si denies hi/avh presently, safety & comfort measures maintained eval on going.

## 2023-10-08 NOTE — ED ADULT TRIAGE NOTE - CHIEF COMPLAINT QUOTE
Pt c/o increasing anxiety over past few days, reports due to anxiety he has been smoking marijuana and drinking everyday for past week. Last drink last night, reports he drank "cider." Reports wants to hurt people who make him mad, has no plan. Denies suicidal ideation, auditory/visual hallucinations. Hx: psychotic disorder Pt c/o increasing anxiety over past few days, reports due to anxiety he has been smoking marijuana and drinking everyday for past week. Last drink last night, reports he drank "cider." Reports wants to hurt people who make him mad, has no plan. Pt currently calm and cooperative. Denies suicidal ideation, auditory/visual hallucinations. Hx: psychotic disorder

## 2023-10-08 NOTE — ED BEHAVIORAL HEALTH ASSESSMENT NOTE - RISK ASSESSMENT
Patient is at elevated risk of harm to self and others. Chronic risk factors include diagnosis of schizoaffective, bipolar, hx of prior hospitalizations, hx of quick decompensation, male gender. Acute risk factors include current manic symptoms, global insomnia, SI with plan. Patient has decompensated despite high level of care at [facility] hospital program and requires inpatient psychiatric hospitalization for further stabilization and safety for ongoing symptoms

## 2023-10-08 NOTE — ED BEHAVIORAL HEALTH NOTE - BEHAVIORAL HEALTH NOTE
MARAH called and spoke with friend Maria Del Carmen P#[phone] in efforts to obtain collateral information:     Maria Del Carmen is a friend of the pt who she has known since 2018.  She last saw the pt in September but often speaks to him.  She last spoke to the pt this morning.  As per Maria Del Carmen, pt expressed that he feels he is having manic symptoms and wanted to go to the hospital where he feels safe.  Per Maria Del Carmen, Pt this morning did not express any suicidal ideation, delusions or paranoia.  He often has grandiose ideas about political organizing and trying  to solve the world's problems.  Per Maria Del Carmen, pt lives with another family member and his father; however father is back in [facility] at this time and has not come.  Pt has been trying to find a job in efforts to be able to pay for food and rent as father left him some money but he has already spent it all.   Maria Del Carmen denies pt talking to her about suicide ideation or attempts. Maria Del Carmen is not aware if there are any weapons in the home.  She believes pt is on Lithium but does not know who prescribes the medication.  Maria Del Carmen has no contact information for family members who reside in [place].      PT also provided the following number [phone] for Alicia; MARAH called and left msg requesting call back.     RIVER Hill informed of the above.

## 2023-10-08 NOTE — ED BEHAVIORAL HEALTH ASSESSMENT NOTE - PSYCHIATRIC ISSUES AND PLAN (INCLUDE STANDING AND PRN MEDICATION)
Continue home lithium 600 and Zyprexa 20mg; hold off on restarting home Depakote for inpatient team; PRN seroquel/ativan PO, PRN ativan IM for agitation.

## 2023-10-08 NOTE — ED BEHAVIORAL HEALTH NOTE - BEHAVIORAL HEALTH NOTE
RIVER Leyva requested collateral information for pt.      SW called father Charlie Kay [phone] and left ms.  MARAH also called number provided by pt [phone]; person who answered stated he is not related to the pt.

## 2023-10-08 NOTE — ED ADULT NURSE NOTE - CHIEF COMPLAINT QUOTE
Pt c/o increasing anxiety over past few days, reports due to anxiety he has been smoking marijuana and drinking everyday for past week. Last drink last night, reports he drank "cider." Reports wants to hurt people who make him mad, has no plan. Pt currently calm and cooperative. Denies suicidal ideation, auditory/visual hallucinations. Hx: psychotic disorder

## 2023-10-08 NOTE — ED BEHAVIORAL HEALTH ASSESSMENT NOTE - DETAILS
spoke to ED boarding reports "allergies" to haldol, zyprexa, and prolixin - dystonic reaction/EPS, Risperdal - concern for NMS, zyprexa - elevated CK/concern for NMS as per HPI per transfer protocol

## 2023-10-08 NOTE — ED BEHAVIORAL HEALTH ASSESSMENT NOTE - DESCRIPTION
Cooperative with interview, pacing and restless at times    ICU Vital Signs Last 24 Hrs  T(C): 36.9 (08 Oct 2023 16:02), Max: 36.9 (08 Oct 2023 16:02)  T(F): 98.5 (08 Oct 2023 16:02), Max: 98.5 (08 Oct 2023 16:02)  HR: 112 (08 Oct 2023 11:59) (112 - 112)  BP: 114/78 (08 Oct 2023 16:02) (114/78 - 140/92)  BP(mean): --  ABP: --  ABP(mean): --  RR: 17 (08 Oct 2023 16:02) (16 - 17)  SpO2: 100% (08 Oct 2023 16:02) (99% - 100%)    O2 Parameters below as of 08 Oct 2023 16:02  Patient On (Oxygen Delivery Method): room air none see HPI

## 2023-10-08 NOTE — ED PROVIDER NOTE - OBJECTIVE STATEMENT
27 yo M living with step-father who is currently out of the country in [place] and an uncle who is "mysterious", with PPHx of Schizoaffective, bipolar type, at least 5 past hospitalizations last at [facility] last dc at Oct 1st. Report compliance with lithium 600, depakote 500, Zyprexa 20mg. Admits to drinking and smoking everyday last drink this morning. Report SI with plan to either jump off his apartment building at [address] [place] [address] or stab himself with a knife. Presents with disorganization, disheveled, and ill appearing. Denies fever, headache, dizziness, HI/AH/VH, chills, chest pain, shortness of breath, abdominal pain, sick contact or recent travel.

## 2023-10-08 NOTE — ED BEHAVIORAL HEALTH ASSESSMENT NOTE - NSBHROSSYSTEMS_PSY_ALL_CORE
currently he denied experiencing any headaches, no dizziness, no blurring of vision; no sorethroat; no cough, no fever. no chest pains, no SOB, no palpitations, no abdominal pains, no nausea/ vomiting/ diarrhea, no dysuria, no hesitancy, no arthralgia/ no pruritus. denied any muscle/ joint pains

## 2023-10-09 VITALS
DIASTOLIC BLOOD PRESSURE: 81 MMHG | RESPIRATION RATE: 16 BRPM | HEART RATE: 80 BPM | SYSTOLIC BLOOD PRESSURE: 115 MMHG | TEMPERATURE: 98 F | OXYGEN SATURATION: 100 %

## 2023-10-09 RX ORDER — OLANZAPINE 15 MG/1
20 TABLET, FILM COATED ORAL ONCE
Refills: 0 | Status: COMPLETED | OUTPATIENT
Start: 2023-10-09 | End: 2023-10-09

## 2023-10-09 RX ADMIN — Medication 1 TABLET(S): at 14:59

## 2023-10-09 RX ADMIN — Medication 2 MILLIGRAM(S): at 00:01

## 2023-10-09 RX ADMIN — OLANZAPINE 20 MILLIGRAM(S): 15 TABLET, FILM COATED ORAL at 09:00

## 2023-10-09 RX ADMIN — LITHIUM CARBONATE 600 MILLIGRAM(S): 300 TABLET, EXTENDED RELEASE ORAL at 05:20

## 2023-10-09 RX ADMIN — Medication 1 MILLIGRAM(S): at 14:59

## 2023-10-09 RX ADMIN — Medication 2 MILLIGRAM(S): at 05:20

## 2023-10-09 NOTE — ED BEHAVIORAL HEALTH PROGRESS NOTE - CASE SUMMARY/FORMULATION (CLEARLY DOCUMENT RATIONALE FOR DISPOSITION CHANGE)
As above, pt awaiting bed As above, pt awaiting bed.  -CIWA for alcohol withdrawal As above, pt awaiting bed.  -continue to monitor for sx of alcohol withdrawal, CIWA was 2

## 2023-10-09 NOTE — ED BEHAVIORAL HEALTH NOTE - BEHAVIORAL HEALTH NOTE
attempted to call Pt's father: Charlie Rahul at [phone] to inform him that Pt will be transferred for in-Pt psych admission  - went straight to Mr Rahul's voice mail: left detailed voice mail

## 2023-10-09 NOTE — ED ADULT NURSE REASSESSMENT NOTE - PAIN RATING/NUMBER SCALE (0-10): ACTIVITY
0 (no pain/absence of nonverbal indicators of pain)
Jose Johnson
0 (no pain/absence of nonverbal indicators of pain)
0 (no pain/absence of nonverbal indicators of pain)

## 2023-10-09 NOTE — ED BEHAVIORAL HEALTH NOTE - BEHAVIORAL HEALTH NOTE
Writer received VM from Debbie GILLIAM from [facility] ([facility] ([phone], After hours #[phone]) who provided Auth # [id number] and says it is good for one business day.

## 2023-10-09 NOTE — ED BEHAVIORAL HEALTH PROGRESS NOTE - STANDING MEDS RECEIVED IN ED DETAILS FREE TEXT
lihtium 600 mg qhs and zyprexa 20 mg qhs and folic acid lihtium 600 mg qd and zyprexa 20 mg qd and folic acid

## 2023-10-09 NOTE — ED BEHAVIORAL HEALTH PROGRESS NOTE - SUMMARY
Patient is a 27yo M, single, unemployed, noncaregiver, domiciled with step-father who is currently out of the country, with PPHx of documented Schizoaffective, bipolar type, at least 5 past hospitalizations last at [facility] last dc at Oct 1st, no known hx of suicidality or violence, no previous hx of substance use but pt stating recent frequent use of alcohol, no relevant PMHx who presents to the ED after recent discharge from [facility] ED for anxiety and worsening of functioning.    Patient's presents labile, has some symptoms of sophie including at times flight of ideas, decreased need for sleep, fluctuating mood but at times very elevated, grandiose. Pt also endorsing SI with plan to jump from window. All of this is in the context of potential recent use of substances although with a Utox that is negative and potential nonadherence to medications as Depakote level is neglible but within therapeutic range for lithium level. Given pt's poor functioning, lability, and desire for inpatient admission, pt signed 9.13 for stabilization given risk of harm to self and inability to function in an outpatient setting currently.

## 2023-10-09 NOTE — ED ADULT NURSE REASSESSMENT NOTE - NS ED NURSE REASSESS COMMENT FT1
At approximately 2230, pt presents awake and alert, ambulatory, restless and anxious. Pt requests medication to help him sleep. RIVER Leyva made aware, informed author to administer Benadryl PO. Pt tolerates medication, however, still complaining of restlessness and requesting more medication. RIVER Leyva made aware, informs author to administer Ativan 2mg IM. Pt medicated as per orders, Respirations are even and unlabored, no signs of respiratory distress.
Pt is awake, NAD, even unlabored respirations observed. VS as noted. Safety measures maintained. Care ongoing.
Pt sleeping in bed, NAD, Respirations are even and unlabored, no signs of respiratory distress.
Pt a&ox4, ambulatory, presents restless, NAD. Respirations are even and unlabored, no signs of respiratory distress.
Pt awake and alert, ambulatory, Denies CP, SOB, dyspnea, or pain at this time. Respirations are even and unlabored, no signs of respiratory distress.
Pt a&ox4, NAD, ambulatory, Denies CP, SOB, dyspnea, or pain at this time. Respirations are even and unlabored, no signs of respiratory distress.

## 2023-10-09 NOTE — ED BEHAVIORAL HEALTH PROGRESS NOTE - RISK ASSESSMENT
Patient is at elevated risk of harm to self and others. Chronic risk factors include diagnosis of schizoaffective, bipolar, hx of prior hospitalizations, hx of quick decompensation, male gender. Acute risk factors include current manic symptoms, global insomnia, SI with plan. Patient has decompensated despite high level of care at [facility] hospital program and requires inpatient psychiatric hospitalization for further stabilization and safety for ongoing symptoms

## 2023-10-09 NOTE — ED BEHAVIORAL HEALTH NOTE - BEHAVIORAL HEALTH NOTE
Writer called Sar[facility] spoke to Jill BELL [phone] who took clinical information states supervisor till call in one hour with auth # call reference is [id number]

## 2024-02-03 ENCOUNTER — INPATIENT (INPATIENT)
Facility: HOSPITAL | Age: 29
LOS: 19 days | Discharge: HOME | End: 2024-02-23
Attending: PSYCHIATRY & NEUROLOGY | Admitting: PSYCHIATRY & NEUROLOGY
Payer: MEDICAID

## 2024-02-03 VITALS
HEART RATE: 112 BPM | DIASTOLIC BLOOD PRESSURE: 74 MMHG | SYSTOLIC BLOOD PRESSURE: 121 MMHG | OXYGEN SATURATION: 98 % | TEMPERATURE: 98 F | RESPIRATION RATE: 20 BRPM

## 2024-02-03 DIAGNOSIS — F32.9 MAJOR DEPRESSIVE DISORDER, SINGLE EPISODE, UNSPECIFIED: ICD-10-CM

## 2024-02-03 LAB
ALBUMIN SERPL ELPH-MCNC: 4.3 G/DL — SIGNIFICANT CHANGE UP (ref 3.3–5)
ALP SERPL-CCNC: 88 U/L — SIGNIFICANT CHANGE UP (ref 40–120)
ALT FLD-CCNC: 19 U/L — SIGNIFICANT CHANGE UP (ref 4–41)
ANION GAP SERPL CALC-SCNC: 11 MMOL/L — SIGNIFICANT CHANGE UP (ref 7–14)
APAP SERPL-MCNC: <10 UG/ML — LOW (ref 15–25)
AST SERPL-CCNC: 22 U/L — SIGNIFICANT CHANGE UP (ref 4–40)
BASOPHILS # BLD AUTO: 0.05 K/UL — SIGNIFICANT CHANGE UP (ref 0–0.2)
BASOPHILS NFR BLD AUTO: 0.5 % — SIGNIFICANT CHANGE UP (ref 0–2)
BILIRUB SERPL-MCNC: 0.2 MG/DL — SIGNIFICANT CHANGE UP (ref 0.2–1.2)
BUN SERPL-MCNC: 7 MG/DL — SIGNIFICANT CHANGE UP (ref 7–23)
CALCIUM SERPL-MCNC: 9.3 MG/DL — SIGNIFICANT CHANGE UP (ref 8.4–10.5)
CHLORIDE SERPL-SCNC: 105 MMOL/L — SIGNIFICANT CHANGE UP (ref 98–107)
CO2 SERPL-SCNC: 22 MMOL/L — SIGNIFICANT CHANGE UP (ref 22–31)
CREAT SERPL-MCNC: 0.94 MG/DL — SIGNIFICANT CHANGE UP (ref 0.5–1.3)
EGFR: 115 ML/MIN/1.73M2 — SIGNIFICANT CHANGE UP
EOSINOPHIL # BLD AUTO: 0.31 K/UL — SIGNIFICANT CHANGE UP (ref 0–0.5)
EOSINOPHIL NFR BLD AUTO: 3.1 % — SIGNIFICANT CHANGE UP (ref 0–6)
ETHANOL SERPL-MCNC: <10 MG/DL — SIGNIFICANT CHANGE UP
GLUCOSE SERPL-MCNC: 115 MG/DL — HIGH (ref 70–99)
HCT VFR BLD CALC: 45.5 % — SIGNIFICANT CHANGE UP (ref 39–50)
HGB BLD-MCNC: 15.3 G/DL — SIGNIFICANT CHANGE UP (ref 13–17)
IANC: 6.66 K/UL — SIGNIFICANT CHANGE UP (ref 1.8–7.4)
IMM GRANULOCYTES NFR BLD AUTO: 0.4 % — SIGNIFICANT CHANGE UP (ref 0–0.9)
LITHIUM SERPL-MCNC: 1.5 MMOL/L — HIGH (ref 0.6–1.2)
LYMPHOCYTES # BLD AUTO: 1.96 K/UL — SIGNIFICANT CHANGE UP (ref 1–3.3)
LYMPHOCYTES # BLD AUTO: 19.3 % — SIGNIFICANT CHANGE UP (ref 13–44)
MCHC RBC-ENTMCNC: 29.7 PG — SIGNIFICANT CHANGE UP (ref 27–34)
MCHC RBC-ENTMCNC: 33.6 GM/DL — SIGNIFICANT CHANGE UP (ref 32–36)
MCV RBC AUTO: 88.2 FL — SIGNIFICANT CHANGE UP (ref 80–100)
MONOCYTES # BLD AUTO: 1.14 K/UL — HIGH (ref 0–0.9)
MONOCYTES NFR BLD AUTO: 11.2 % — SIGNIFICANT CHANGE UP (ref 2–14)
NEUTROPHILS # BLD AUTO: 6.66 K/UL — SIGNIFICANT CHANGE UP (ref 1.8–7.4)
NEUTROPHILS NFR BLD AUTO: 65.5 % — SIGNIFICANT CHANGE UP (ref 43–77)
NRBC # BLD: 0 /100 WBCS — SIGNIFICANT CHANGE UP (ref 0–0)
NRBC # FLD: 0 K/UL — SIGNIFICANT CHANGE UP (ref 0–0)
PLATELET # BLD AUTO: 296 K/UL — SIGNIFICANT CHANGE UP (ref 150–400)
POTASSIUM SERPL-MCNC: 3.9 MMOL/L — SIGNIFICANT CHANGE UP (ref 3.5–5.3)
POTASSIUM SERPL-SCNC: 3.9 MMOL/L — SIGNIFICANT CHANGE UP (ref 3.5–5.3)
PROT SERPL-MCNC: 7.8 G/DL — SIGNIFICANT CHANGE UP (ref 6–8.3)
RBC # BLD: 5.16 M/UL — SIGNIFICANT CHANGE UP (ref 4.2–5.8)
RBC # FLD: 12.9 % — SIGNIFICANT CHANGE UP (ref 10.3–14.5)
SALICYLATES SERPL-MCNC: <0.3 MG/DL — LOW (ref 15–30)
SARS-COV-2 RNA SPEC QL NAA+PROBE: SIGNIFICANT CHANGE UP
SODIUM SERPL-SCNC: 138 MMOL/L — SIGNIFICANT CHANGE UP (ref 135–145)
TOXICOLOGY SCREEN, DRUGS OF ABUSE, SERUM RESULT: SIGNIFICANT CHANGE UP
TSH SERPL-MCNC: 2.11 UIU/ML — SIGNIFICANT CHANGE UP (ref 0.27–4.2)
WBC # BLD: 10.16 K/UL — SIGNIFICANT CHANGE UP (ref 3.8–10.5)
WBC # FLD AUTO: 10.16 K/UL — SIGNIFICANT CHANGE UP (ref 3.8–10.5)

## 2024-02-03 PROCEDURE — 99285 EMERGENCY DEPT VISIT HI MDM: CPT

## 2024-02-03 RX ORDER — HALOPERIDOL DECANOATE 100 MG/ML
5 INJECTION INTRAMUSCULAR ONCE
Refills: 0 | Status: DISCONTINUED | OUTPATIENT
Start: 2024-02-04 | End: 2024-02-23

## 2024-02-03 RX ORDER — ACETAMINOPHEN 500 MG
650 TABLET ORAL EVERY 6 HOURS
Refills: 0 | Status: DISCONTINUED | OUTPATIENT
Start: 2024-02-04 | End: 2024-02-23

## 2024-02-03 RX ORDER — MAGNESIUM HYDROXIDE 400 MG/1
30 TABLET, CHEWABLE ORAL DAILY
Refills: 0 | Status: DISCONTINUED | OUTPATIENT
Start: 2024-02-04 | End: 2024-02-23

## 2024-02-03 RX ORDER — DIPHENHYDRAMINE HCL 50 MG
50 CAPSULE ORAL EVERY 6 HOURS
Refills: 0 | Status: DISCONTINUED | OUTPATIENT
Start: 2024-02-04 | End: 2024-02-23

## 2024-02-03 RX ORDER — QUETIAPINE FUMARATE 200 MG/1
50 TABLET, FILM COATED ORAL AT BEDTIME
Refills: 0 | Status: DISCONTINUED | OUTPATIENT
Start: 2024-02-04 | End: 2024-02-23

## 2024-02-03 RX ORDER — DIPHENHYDRAMINE HCL 50 MG
50 CAPSULE ORAL ONCE
Refills: 0 | Status: DISCONTINUED | OUTPATIENT
Start: 2024-02-04 | End: 2024-02-23

## 2024-02-03 RX ORDER — HALOPERIDOL DECANOATE 100 MG/ML
5 INJECTION INTRAMUSCULAR EVERY 6 HOURS
Refills: 0 | Status: DISCONTINUED | OUTPATIENT
Start: 2024-02-04 | End: 2024-02-23

## 2024-02-03 NOTE — ED BEHAVIORAL HEALTH ASSESSMENT NOTE - PAST PSYCHOTROPIC MEDICATION
Risperdal - concern for NMS, Haldol, zyprexa - elevated CK/concern for NMS  thorazine - elevated CK/concern for NMS, Lithium, Clozapine - only able to titrate to 50mg TID due to tachycardia, prolixin  Zyprexa

## 2024-02-03 NOTE — ED ADULT NURSE NOTE - CHIEF COMPLAINT QUOTE
states" I feel depressed and having suicidal thoughts". patient has d/c papers from January 31 from [facility] d/c with Bipolar on lithium, Valproic acid. patient is calm and cooperative

## 2024-02-03 NOTE — ED BEHAVIORAL HEALTH ASSESSMENT NOTE - HPI (INCLUDE ILLNESS QUALITY, SEVERITY, DURATION, TIMING, CONTEXT, MODIFYING FACTORS, ASSOCIATED SIGNS AND SYMPTOMS)
Patient is a 29yo M, single, unemployed, noncaregiver, domiciled with step-father/sister/uncle, with PPHx of documented Schizoaffective, bipolar type, at least 6 past hospitalizations last at [facility] 2 days ago, no known hx of suicidality or violence, no substance use, no relevant PMHx who presents to the ED after recent discharge from [facility] ED for anxiety.     On evaluation, patient is AOx3, tangential, with psychomotor agitation and choosing to stand intermittently. Fast speech but not pressured and can be interrupted. Require redirection at times.     Pt states that he was sent in [facility] to hospital by friend, Maria Del Carmen, who lives in [place]. Pt reports recent dc from [facility] two days ago. Since dc has been feeling anxious with low mood. Pt endorses not feeling safe at home and reports negative thoughts. Denies SI but states if he did he would die by hanging. Pt has not made any preparatory acts. Denies NSSIB. Denies hx of or current violence/aggression. Denies HI/I/P.  States that he has not been sleeping well. Pt is religiously preoccupied. Of note, similar to presentation in 10/2023, states that he went to [facility] ED last night attempting to be admitted but was discharged then brought himself to [facility]. Pt denies paranoia, avh, IOR. Reports dc from [facility] on Depakote and Lithium BID, reports adherence with last dose this morning. Denies substance. No hx of violence/aggression. No legal hx.     See  chart note for collateral.
none

## 2024-02-03 NOTE — ED PROVIDER NOTE - OBJECTIVE STATEMENT
29 y/o M hx MDD BIBA secondary to worsening depression and suicidal ideations.  Admits multiple social stressors and inability to cope.  Denies HI/VH/AH.  Denies pain, SOB, fever, chills, chest/abdominal discomfort. Denies falling, punching or kicking any objects. No evidence of physical injuries, broken skin or deformities.   Denies use of alochol or illicit drugs.

## 2024-02-03 NOTE — ED BEHAVIORAL HEALTH ASSESSMENT NOTE - DETAILS
family SRAVANI Denies SI/HI reports "allergies" to haldol, zyprexa, and prolixin - dystonic reaction/EPS, Risperdal - concern for NMS, zyprexa - elevated CK/concern for NMS step father

## 2024-02-03 NOTE — ED PROVIDER NOTE - CPE EDP NEURO NORM
normal... Purse String (Simple) Text: Given the location of the defect and the characteristics of the surrounding skin a purse string closure was deemed most appropriate.  Undermining was performed circumfirentially around the surgical defect.  A purse string suture was then placed and tightened.

## 2024-02-03 NOTE — ED ADULT NURSE NOTE - OBJECTIVE STATEMENT
Received pt in  pt calm c/o depression & si thoughts no plan pt denies hi/avh presently, safety & comfort measures maintained eval on going.

## 2024-02-03 NOTE — BH PATIENT PROFILE - FALL HARM RISK - UNIVERSAL INTERVENTIONS
Bed in lowest position, wheels locked, appropriate side rails in place/Call bell, personal items and telephone in reach/Instruct patient to call for assistance before getting out of bed or chair/Non-slip footwear when patient is out of bed/[place] to call system/Physically safe environment - no spills, clutter or unnecessary equipment/Purposeful Proactive Rounding/Room/bathroom lighting operational, light cord in reach

## 2024-02-03 NOTE — ED BEHAVIORAL HEALTH NOTE - BEHAVIORAL HEALTH NOTE
Collateral obtained from step-father, Charlie,[phone].     pt was dc'd from [facility] in  yesterday. Did not sleep well and was pacing last night and this morning. Was told by pt that he took his medication this morning. Step father was not home and knew pt's friend sent him to hospital but did not know where he went. Pt did not endorse SI/HI at home. Step father provided number for friend, Maria Del Carmen [phone].     Attempted to reach Maria Del Carmen, message "wireless customer not available".

## 2024-02-03 NOTE — ED BEHAVIORAL HEALTH ASSESSMENT NOTE - DESCRIPTION
single, noncaregiver, lives with family, not in school, not working Cooperative with interview, restless  Vital Signs Last 24 Hrs  T(C): 36.8 (03 Feb 2024 18:01), Max: 36.8 (03 Feb 2024 18:01)  T(F): 98.3 (03 Feb 2024 18:01), Max: 98.3 (03 Feb 2024 18:01)  HR: 112 (03 Feb 2024 18:01) (112 - 112)  BP: 121/74 (03 Feb 2024 18:01) (121/74 - 121/74)  BP(mean): --  RR: 20 (03 Feb 2024 18:01) (20 - 20)  SpO2: 98% (03 Feb 2024 18:01) (98% - 98%)    Parameters below as of 03 Feb 2024 18:01  Patient On (Oxygen Delivery Method): room air none

## 2024-02-03 NOTE — ED BEHAVIORAL HEALTH ASSESSMENT NOTE - NSBHATTESTCOMMENTATTENDFT_PSY_A_CORE
Patient also seen by Attending. Would benefit from medication changes - only on 2 mood stabilizers (unclear why not just one that is maximized) with obvious need for an antipsychotic. Patient is help seeking and subjectively feels unwell.

## 2024-02-03 NOTE — ED ADULT TRIAGE NOTE - CHIEF COMPLAINT QUOTE
states" I feel depressed and having suicidal thoughts". patient has d/c papers from January 31 from [facility] d/c with Bipolar on lithium, Valproic acid. patient is calm and cooperative

## 2024-02-03 NOTE — ED BEHAVIORAL HEALTH ASSESSMENT NOTE - RISK ASSESSMENT
sensory intact
Chronic risk factors include diagnosis of schizoaffective, bipolar, hx of prior hospitalizations, hx of quick decompensation, male gender. Acute risk factors include insomnia, anxiety

## 2024-02-03 NOTE — ED BEHAVIORAL HEALTH ASSESSMENT NOTE - OTHER PAST PSYCHIATRIC HISTORY (INCLUDE DETAILS REGARDING ONSET, COURSE OF ILLNESS, INPATIENT/OUTPATIENT TREATMENT)
At least 6 prior hospitalizations  first at [facility] 2014  last [facility] 01/2024

## 2024-02-03 NOTE — ED PROVIDER NOTE - HISTORY ATTESTATION, MLM
This patient has been identified as being eligible for the Care Transitions program, a post-hospital discharge program designed to decrease readmission risk and increase continuity of care for patients. Awaiting discharge determination.   I have reviewed and confirmed nurses' notes...

## 2024-02-03 NOTE — ED ADULT NURSE NOTE - NSFALLUNIVINTERV_ED_ALL_ED
Bed/Stretcher in lowest position, wheels locked, appropriate side rails in place/Call bell, personal items and telephone in reach/Instruct patient to call for assistance before getting out of bed/chair/stretcher/Non-slip footwear applied when patient is off stretcher/[place] to call system/Physically safe environment - no spills, clutter or unnecessary equipment/Purposeful proactive rounding/Room/bathroom lighting operational, light cord in reach

## 2024-02-03 NOTE — ED BEHAVIORAL HEALTH ASSESSMENT NOTE - SUMMARY
Patient is a 27yo M, single, unemployed, noncaregiver, domiciled with step-father who is currently out of the country, with PPHx of documented Schizoaffective, bipolar type, at least 5 past hospitalizations last at [facility] last dc at Oct 1st, no known hx of suicidality or violence, no previous hx of substance use but pt stating recent frequent use of alcohol, no relevant PMHx who presents to the ED after recent discharge from [facility] ED for anxiety and worsening of functioning.    Patient's presents labile, talkative, fast speech rate but not pressured, hyprreligious. Pt denies SI but reports negative thoughts and feeling unsafe to go home. Pt with plan to hang self, no preparatory acts, no intent. Of note, pt with recent inpatient dc 2 days ago at [facility], and was seen at [facility] ED yesterday and dc'd. Denies SI/HI/I/P. Restless but no behavioral disturbances in ED. Per obtained collateral, pt is medication adherent. Attempted to reach friend who sent pt to ED but unsuccessful. At this time, pt is in acute decompensation despite recent inpatient hospitalization and is unable to care for self in community. Pt will be admitted to inpatient psychiatry on 9.13 status for medication management. Pt has a hx of well documented manic episodes, currently only on Lithium and Depakote. Pt presenting with bilateral tremors as possible SE of medications. Recommendation to hold home medications for wash out and def standing meds to primary inpatient team.     Plan:  -Admit to [facility] on 9.13  - Routine obs  -PRNS: Haldol 5 mg, Ativan 2 mg, Benadryl 50 mg PO/IM q6  -HOLD Lithium and Depakote  -Defer standing meds to inpatient team  -No medical issues  -No Substance Patient is a 27yo M, single, unemployed, noncaregiver, domiciled with step-father who is currently out of the country, with PPHx of documented Schizoaffective, bipolar type, at least 5 past hospitalizations last at [facility] last dc at Oct 1st, no known hx of suicidality or violence, no previous hx of substance use but pt stating recent frequent use of alcohol, no relevant PMHx who presents to the ED after recent discharge from [facility] ED for anxiety and worsening of functioning.    Patient's presents labile, talkative, fast speech rate but not pressured, hyprreligious. Pt denies SI but reports negative thoughts and feeling unsafe to go home. Pt with plan to hang self, no preparatory acts, no intent. Of note, pt with recent inpatient dc 2 days ago at [facility], and was seen at [facility] ED yesterday and dc'd. Denies SI/HI/I/P. Restless but no behavioral disturbances in ED. Per obtained collateral, pt is medication adherent. Attempted to reach friend who sent pt to ED but unsuccessful. At this time, pt is in acute decompensation despite recent inpatient hospitalization and is unable to care for self in community. Pt will be admitted to inpatient psychiatry on 9.13 status for medication management. Pt has a hx of well documented manic episodes, currently only on Lithium and Depakote. Pt presenting with bilateral tremors as possible SE of medications. Lithium lvl 1.5.  Recommendation to hold home medications for wash out and def standing meds to primary inpatient team.     Plan:  -Admit to [facility] on 9.13  - Routine obs  -PRNS: Haldol 5 mg, Ativan 2 mg, Benadryl 50 mg PO/IM q6  -HOLD Lithium and Depakote  -Lithium lvl 1.5 2/3  -Defer standing meds to inpatient team  -No medical issues  -No Substance Patient is a 29yo M, single, unemployed, noncaregiver, domiciled with step-father who is currently out of the country, with PPHx of documented Schizoaffective, bipolar type, at least 5 past hospitalizations last at [facility] last dc at Oct 1st, no known hx of suicidality or violence, no previous hx of substance use but pt stating recent frequent use of alcohol, no relevant PMHx who presents to the ED after recent discharge from [facility] ED for anxiety and worsening of functioning.    Patient's presents labile, talkative, fast speech rate but not pressured, hyprreligious. Pt denies SI but reports negative thoughts and feeling unsafe to go home. Pt with plan to hang self, no preparatory acts, no intent. Of note, pt with recent inpatient dc 2 days ago at [facility], and was seen at [facility] ED yesterday and dc'd. Denies SI/HI/I/P. Restless but no behavioral disturbances in ED. Per obtained collateral, pt is medication adherent. Attempted to reach friend who sent pt to ED but unsuccessful. At this time, pt is in acute decompensation despite recent inpatient hospitalization and is unable to care for self in community. Pt will be admitted to inpatient psychiatry on 9.13 status for medication management. Pt has a hx of well documented manic episodes, currently only on Lithium and Depakote. Pt presenting with bilateral tremors as possible SE of medications. Hold Lithium, serum lvl 1.5.  Recommendation to hold home medications for wash out and def standing meds to primary inpatient team.     Plan:  -Admit to [facility] on 9.13  - Routine obs  -PRNS: Haldol 5 mg, Ativan 2 mg, Benadryl 50 mg PO/IM q6  -HOLD Lithium and Depakote  ------Lithium lvl 1.5 2/3-  -Defer standing meds to inpatient team  -No medical issues  -No Substance

## 2024-02-04 PROCEDURE — 99222 1ST HOSP IP/OBS MODERATE 55: CPT

## 2024-02-04 RX ORDER — INFLUENZA VIRUS VACCINE 15; 15; 15; 15 UG/.5ML; UG/.5ML; UG/.5ML; UG/.5ML
0.5 SUSPENSION INTRAMUSCULAR ONCE
Refills: 0 | Status: DISCONTINUED | OUTPATIENT
Start: 2024-02-04 | End: 2024-02-23

## 2024-02-04 RX ADMIN — QUETIAPINE FUMARATE 50 MILLIGRAM(S): 200 TABLET, FILM COATED ORAL at 20:55

## 2024-02-04 RX ADMIN — Medication 2 MILLIGRAM(S): at 08:05

## 2024-02-04 NOTE — BH INPATIENT PSYCHIATRY ASSESSMENT NOTE - OTHER PAST PSYCHIATRIC HISTORY (INCLUDE DETAILS REGARDING ONSET, COURSE OF ILLNESS, INPATIENT/OUTPATIENT TREATMENT)
At least 6 prior hospitalizations  first at [facility] 2014  last [facility] 01/2024

## 2024-02-04 NOTE — BH INPATIENT PSYCHIATRY ASSESSMENT NOTE - NSBHCHARTREVIEWVS_PSY_A_CORE FT
Vital Signs Last 24 Hrs  T(C): 36.8 (02-04-24 @ 06:56), Max: 36.8 (02-03-24 @ 18:01)  T(F): 98.2 (02-04-24 @ 06:56), Max: 98.3 (02-03-24 @ 18:01)  HR: 76 (02-03-24 @ 23:12) (76 - 112)  BP: 114/78 (02-03-24 @ 23:12) (114/78 - 121/74)  BP(mean): --  RR: 18 (02-03-24 @ 23:55) (17 - 20)  SpO2: 99% (02-04-24 @ 06:56) (98% - 99%)    Orthostatic VS  02-04-24 @ 06:56  Lying BP: --/-- HR: --  Sitting BP: 114/80 HR: 87  Standing BP: 116/71 HR: 89  Site: upper left arm  Mode: electronic  Orthostatic VS  02-03-24 @ 23:55  Lying BP: --/-- HR: --  Sitting BP: 120/77 HR: 90  Standing BP: 127/71 HR: 94  Site: upper left arm  Mode: electronic   Vital Signs Last 24 Hrs  T(C): 36.8 (02-04-24 @ 06:56), Max: 36.8 (02-04-24 @ 06:56)  T(F): 98.2 (02-04-24 @ 06:56), Max: 98.2 (02-04-24 @ 06:56)  HR: 76 (02-03-24 @ 23:12) (76 - 76)  BP: 114/78 (02-03-24 @ 23:12) (114/78 - 114/78)  BP(mean): --  RR: 18 (02-03-24 @ 23:55) (17 - 18)  SpO2: 99% (02-04-24 @ 06:56) (99% - 99%)    Orthostatic VS  02-04-24 @ 06:56  Lying BP: --/-- HR: --  Sitting BP: 114/80 HR: 87  Standing BP: 116/71 HR: 89  Site: upper left arm  Mode: electronic  Orthostatic VS  02-03-24 @ 23:55  Lying BP: --/-- HR: --  Sitting BP: 120/77 HR: 90  Standing BP: 127/71 HR: 94  Site: upper left arm  Mode: electronic

## 2024-02-04 NOTE — BH INPATIENT PSYCHIATRY ASSESSMENT NOTE - NSBHMETABOLIC_PSY_ALL_CORE_FT
BMI: BMI (kg/m2): 33.7 (02-03-24 @ 23:55)  HbA1c:   Glucose:   BP: 114/78 (02-03-24 @ 23:12) (114/78 - 121/74)Vital Signs Last 24 Hrs  T(C): 36.8 (02-04-24 @ 06:56), Max: 36.8 (02-03-24 @ 18:01)  T(F): 98.2 (02-04-24 @ 06:56), Max: 98.3 (02-03-24 @ 18:01)  HR: 76 (02-03-24 @ 23:12) (76 - 112)  BP: 114/78 (02-03-24 @ 23:12) (114/78 - 121/74)  BP(mean): --  RR: 18 (02-03-24 @ 23:55) (17 - 20)  SpO2: 99% (02-04-24 @ 06:56) (98% - 99%)    Orthostatic VS  02-04-24 @ 06:56  Lying BP: --/-- HR: --  Sitting BP: 114/80 HR: 87  Standing BP: 116/71 HR: 89  Site: upper left arm  Mode: electronic  Orthostatic VS  02-03-24 @ 23:55  Lying BP: --/-- HR: --  Sitting BP: 120/77 HR: 90  Standing BP: 127/71 HR: 94  Site: upper left arm  Mode: electronic    Lipid Panel:  BMI: BMI (kg/m2): 33.7 (02-03-24 @ 23:55)  HbA1c:   Glucose:   BP: 114/78 (02-03-24 @ 23:12) (114/78 - 121/74)Vital Signs Last 24 Hrs  T(C): 36.8 (02-04-24 @ 06:56), Max: 36.8 (02-04-24 @ 06:56)  T(F): 98.2 (02-04-24 @ 06:56), Max: 98.2 (02-04-24 @ 06:56)  HR: 76 (02-03-24 @ 23:12) (76 - 76)  BP: 114/78 (02-03-24 @ 23:12) (114/78 - 114/78)  BP(mean): --  RR: 18 (02-03-24 @ 23:55) (17 - 18)  SpO2: 99% (02-04-24 @ 06:56) (99% - 99%)    Orthostatic VS  02-04-24 @ 06:56  Lying BP: --/-- HR: --  Sitting BP: 114/80 HR: 87  Standing BP: 116/71 HR: 89  Site: upper left arm  Mode: electronic  Orthostatic VS  02-03-24 @ 23:55  Lying BP: --/-- HR: --  Sitting BP: 120/77 HR: 90  Standing BP: 127/71 HR: 94  Site: upper left arm  Mode: electronic    Lipid Panel:

## 2024-02-04 NOTE — BH INPATIENT PSYCHIATRY ASSESSMENT NOTE - HPI (INCLUDE ILLNESS QUALITY, SEVERITY, DURATION, TIMING, CONTEXT, MODIFYING FACTORS, ASSOCIATED SIGNS AND SYMPTOMS)
As per [facility] ED Assessment:  "Patient is a 29yo M, single, unemployed, noncaregiver, domiciled with step-father/sister/uncle, with PPHx of documented Schizoaffective, bipolar type, at least 6 past hospitalizations last at [facility] dc 2 days ago, no known hx of suicidality or violence, no substance use, no relevant PMHx who presents to the ED after recent discharge from [facility] ED for anxiety.     On evaluation, patient is AOx3, tangential, with psychomotor agitation and choosing to stand intermittently. Fast speech but not pressured and can be interrupted. Require redirection at times.     Pt states that he was sent in [facility] to hospital by friend, Maria Del Carmen, who lives in [place]. Pt reports recent dc from [facility] two days ago. Since dc has been feeling anxious with low mood. Pt endorses not feeling safe at home and reports negative thoughts. Denies SI but states if he did he would die by hanging. Pt has not made any preparatory acts. Denies NSSIB. Denies hx of or current violence/aggression. Denies HI/I/P.  States that he has not been sleeping well. Pt is religiously preoccupied. Of note, similar to presentation in 10/2023, states that he went to [facility] ED last night attempting to be admitted but was discharged then brought himself to [facility]. Pt denies paranoia, avh, IOR. Reports dc from [facility] on Depakote and Lithium BID, reports adherence with last dose this morning. Denies substance. No hx of violence/aggression. No legal hx.     See  chart note for collateral."    On the unit, patient was calmer and speech was normal rate and rhythm. He was given PO PRN Ativan. Patient stated that he felt tired and wanted to lay down. Patient stated that he feels "much better", no longer endorsing SI. He denied any racing thoughts or any mood sx. Denies any AVH or HI. Denies any substance use. No CO needed at this time.

## 2024-02-04 NOTE — PSYCHIATRIC REHAB INITIAL EVALUATION - NSBHPRRECOMMEND_PSY_ALL_CORE
Writer met with pt to orient him to the unit and to establish a collaborative psychiatric goal. Pt was receptive to speaking with writer and presented as elevated, restless, but pleasant. Pt reported reason for admission as “I was having negative thoughts and I didn't know if I would kill myself by hanging but you guys saved me. I'm worried about how much I've gotten into Mu-ism. I read the torah every day now...it's meditative but I also worry it has started these negative thoughts.” Pt reported hoping to have all of his information transferred to [place] so he can be closer to home. Writer collaborated with patient to select an appropriate psychiatric rehabilitation goal to mitigate his anxiety symptoms. Pt was encouraged to go to groups to gain skills. Pt denied any SI/SH/AH/VH.   Psychiatric rehabilitation staff will continue to encourage and provide support to patient to reach their psychiatric goal.

## 2024-02-04 NOTE — BH INPATIENT PSYCHIATRY ASSESSMENT NOTE - CURRENT MEDICATION
MEDICATIONS  (STANDING):  influenza   Vaccine 0.5 milliLiter(s) IntraMuscular once    MEDICATIONS  (PRN):  acetaminophen     Tablet .. 650 milliGRAM(s) Oral every 6 hours PRN Temp greater or equal to 38C (100.4F), Mild Pain (1 - 3)  aluminum hydroxide/magnesium hydroxide/simethicone Suspension 30 milliLiter(s) Oral every 6 hours PRN Dyspepsia  diphenhydrAMINE 50 milliGRAM(s) Oral every 6 hours PRN Extrapyramidal symptoms or prophylaxis  diphenhydrAMINE Injectable 50 milliGRAM(s) IntraMuscular Once PRN agitation  haloperidol     Tablet 5 milliGRAM(s) Oral every 6 hours PRN agitation  haloperidol    Injectable 5 milliGRAM(s) IntraMuscular Once PRN agitation  LORazepam     Tablet 2 milliGRAM(s) Oral every 6 hours PRN Anxiety  LORazepam   Injectable 2 milliGRAM(s) IntraMuscular Once PRN Agitation  magnesium hydroxide Suspension 30 milliLiter(s) Oral daily PRN Constipation  QUEtiapine 50 milliGRAM(s) Oral at bedtime PRN insomnia

## 2024-02-04 NOTE — BH INPATIENT PSYCHIATRY ASSESSMENT NOTE - NSTXMANICGOAL_PSY_ALL_CORE
Be able to identify the early signs of sophie (e.g. sleep and mood changes) and to employ coping strategies to minimize acting out

## 2024-02-04 NOTE — BH INPATIENT PSYCHIATRY ASSESSMENT NOTE - VIOLENCE PROTECTIVE FACTORS:
Quality 130: Documentation Of Current Medications In The Medical Record: Current Medications Documented
Detail Level: Detailed
Residential stability

## 2024-02-04 NOTE — BH INPATIENT PSYCHIATRY ASSESSMENT NOTE - ATTENDING COMMENTS
27 yo male, schizoaffective disorder, prior hospitalizations, recently discharged from [facility], brought in for anxiety, disorganization, psychosis. Pt acknowledges recent hospitalization at [facility], endorses feeling unsafe at home. he felt despite stay at [facility] he continued to have low mood and thoughts of wanting to die. denies intent or plan. he feels safe on the unit. on exam, patient with overproductive speech, overinclusive thought process, impairments in reality testing. impression: psychosis likely exacerbation of schizophrenia / schizoaffective disorder. Agree with plan as above. Hospitalization to assure safety, stabilize symptoms, optimize treatment, coordinate aftercare services

## 2024-02-04 NOTE — BH INPATIENT PSYCHIATRY ASSESSMENT NOTE - NSBHASSESSSUMMFT_PSY_ALL_CORE
Patient is a 29yo M, single, unemployed, noncaregiver, domiciled with step-father who is currently out of the country, with PPHx of documented Schizoaffective, bipolar type, at least 5 past hospitalizations last at [facility] last dc at Oct 1st, no known hx of suicidality or violence, no previous hx of substance use but pt stating recent frequent use of alcohol, no relevant PMHx who presents to the ED after recent discharge from [facility] ED for anxiety and worsening of functioning.  Patient's presents labile, talkative, fast speech rate but not pressured, hyprreligious. Pt denies SI but reports negative thoughts and feeling unsafe to go home. Pt with plan to hang self, no preparatory acts, no intent. Of note, pt with recent inpatient dc 2 days ago at [facility], and was seen at [facility] ED yesterday and dc'd. Denies SI/HI/I/P. Restless but no behavioral disturbances in ED. Per obtained collateral, pt is medication adherent. Attempted to reach friend who sent pt to ED but unsuccessful. At this time, pt is in acute decompensation despite recent inpatient hospitalization and is unable to care for self in community. Pt will be admitted to inpatient psychiatry on 9.13 status for medication management. Pt has a hx of well documented manic episodes, currently only on Lithium and Depakote. Pt presenting with bilateral tremors as possible SE of medications. Hold Lithium, serum lvl 1.5.  Recommendation to hold home medications for wash out and def standing meds to primary inpatient team.     On the unit, patient was calm and speech was less rapid with Ativan PO PRN. Patient denies any current SI.    Plan:  1. Admit to [facility] on 9.13  2. No CO needed  3. Defer standing meds to inpatient team   	PRNS: Haldol 5 mg, Ativan 2 mg, Benadryl 50 mg PO/IM q6  	-HOLD Lithium and Depakote  		[place] lvl 1.5 2/3  4. Labs ordered for am  5. Dispo planning per SW pending clinical improvement

## 2024-02-04 NOTE — BH INPATIENT PSYCHIATRY ASSESSMENT NOTE - NSBHATTESTATTENDBILLTIME_PSY_A_CORE
I attest my time as attending is greater than [place]ERT[place] time spent on qualifying patient care activities. I independently performed the documented

## 2024-02-04 NOTE — BH INPATIENT PSYCHIATRY ASSESSMENT NOTE - DETAILS
reports "allergies" to haldol, zyprexa, and prolixin - dystonic reaction/EPS, Risperdal - concern for NMS, zyprexa - elevated CK/concern for NMS Denies SI/HI

## 2024-02-04 NOTE — BH INPATIENT PSYCHIATRY ASSESSMENT NOTE - RISK ASSESSMENT
Chronic risk factors include diagnosis of schizoaffective, bipolar, hx of prior hospitalizations, hx of quick decompensation, male gender. Acute risk factors include insomnia, anxiety

## 2024-02-05 LAB
A1C WITH ESTIMATED AVERAGE GLUCOSE RESULT: 5.6 % — SIGNIFICANT CHANGE UP (ref 4–5.6)
CHOLEST SERPL-MCNC: 135 MG/DL — SIGNIFICANT CHANGE UP
ESTIMATED AVERAGE GLUCOSE: 114 — SIGNIFICANT CHANGE UP
HDLC SERPL-MCNC: 48 MG/DL — SIGNIFICANT CHANGE UP
LIPID PNL WITH DIRECT LDL SERPL: 57 MG/DL — SIGNIFICANT CHANGE UP
NON HDL CHOLESTEROL: 87 MG/DL — SIGNIFICANT CHANGE UP
TRIGL SERPL-MCNC: 149 MG/DL — SIGNIFICANT CHANGE UP

## 2024-02-05 PROCEDURE — 99232 SBSQ HOSP IP/OBS MODERATE 35: CPT

## 2024-02-05 RX ORDER — OLANZAPINE 15 MG/1
20 TABLET, FILM COATED ORAL AT BEDTIME
Refills: 0 | Status: DISCONTINUED | OUTPATIENT
Start: 2024-02-05 | End: 2024-02-08

## 2024-02-05 RX ORDER — DIVALPROEX SODIUM 500 MG/1
1500 TABLET, DELAYED RELEASE ORAL AT BEDTIME
Refills: 0 | Status: DISCONTINUED | OUTPATIENT
Start: 2024-02-05 | End: 2024-02-23

## 2024-02-05 RX ORDER — LITHIUM CARBONATE 300 MG/1
900 TABLET, EXTENDED RELEASE ORAL AT BEDTIME
Refills: 0 | Status: DISCONTINUED | OUTPATIENT
Start: 2024-02-05 | End: 2024-02-23

## 2024-02-05 RX ADMIN — Medication 2 MILLIGRAM(S): at 09:11

## 2024-02-05 RX ADMIN — OLANZAPINE 20 MILLIGRAM(S): 15 TABLET, FILM COATED ORAL at 20:22

## 2024-02-05 RX ADMIN — LITHIUM CARBONATE 900 MILLIGRAM(S): 300 TABLET, EXTENDED RELEASE ORAL at 20:22

## 2024-02-05 RX ADMIN — DIVALPROEX SODIUM 1500 MILLIGRAM(S): 500 TABLET, DELAYED RELEASE ORAL at 20:22

## 2024-02-05 NOTE — BH INPATIENT PSYCHIATRY PROGRESS NOTE - NSBHFUPINTERVALHXFT_PSY_A_CORE
Patient seen for follow up of sophie, chart reviewed, and case discussed with treatment team.  The patient did not sleep all night.  Staff reports some anxiety, needing prn medication, but no agitation.  On interview, the patient is somewhat disorganized, unable to give a coherent history.  He states he has been more anxious with suicidal thinking since October due to the war in [place].  He has been hospitalized a number of times, recently discharged from [facility] 2 days prior to admission.  He gives an unclear account of how he was doing prior to discharge, but states he started to feel more suicidal and unsafe after discharge.  He states he has been compliant with medications.  (Li level was high on admission, but VPA level low).  He admits to some depression, poor sleep, with fair energy and concentration, high anxiety, with SI.  He continues to have some SI, but denies any active SI on the unit and behavior has been well controlled.  He states he has been on VPA, LI, and Zyprexa (verified by d/c paperwork from the hospital).  He denies ever being on other medications in the past.  The patient has been visible on the unit, social with peers, and attending groups.

## 2024-02-05 NOTE — BH INPATIENT PSYCHIATRY PROGRESS NOTE - NSBHASSESSSUMMFT_PSY_ALL_CORE
27yo M, single, unemployed, noncaregiver, domiciled with step-father/sister/uncle, with PPHx of documented Schizoaffective, bipolar type, at least 6 past hospitalizations last at [facility] 2 days ago, no known hx of suicidality or violence, no substance use, no relevant PMHx who presents to the ED after recent discharge from [facility] ED for worsening anxiety and SI.    The patient is rather disorganized, did not sleep all night, admits to continued SI.  Will resume prior medications for now, message left for patient's outpatient provider.    No 1:1 needed as the patient is calm, pleasant, happy to be getting help.  No active SI on the unit.  Restart Zyprexa 20mg hs (was on 15mg bid)  Restart Lithium 900mg hs (was on 600mg bid - but level high on admission)  Restart VPA as ER 1500mg hs (was on 1000mg bid)  Obtain collateral from outpatient provider - message left  Encourage unit participation

## 2024-02-05 NOTE — BH SOCIAL WORK INITIAL PSYCHOSOCIAL EVALUATION - SAFE PLACE TO LIVE
Returned call, patient answered    Stated she was just looking to be seen before school starts as she's been having problems with her mental health    Did not provide any additional detail    Forwarded to covering  provider if willing to see patient for appointment as Dr. Vani Webb is on maternity leave until end of September    Will also attempt to reschedule with therapist when returning call back   no

## 2024-02-05 NOTE — BH INPATIENT PSYCHIATRY PROGRESS NOTE - NSBHTIMEACTIVITIESPERFORMED_PSY_A_CORE
Clinical time spent with patient including interviewing, charting, obtaining collateral information and discussion in interdisciplinary team meeting.

## 2024-02-05 NOTE — BH INPATIENT PSYCHIATRY PROGRESS NOTE - NSBHCHARTREVIEWVS_PSY_A_CORE FT
Vital Signs Last 24 Hrs  T(C): 36.3 (02-05-24 @ 06:20), Max: 36.8 (02-04-24 @ 19:29)  T(F): 97.4 (02-05-24 @ 06:20), Max: 98.3 (02-04-24 @ 19:29)  HR: --  BP: --  BP(mean): --  RR: --  SpO2: --    Orthostatic VS  02-05-24 @ 06:20  Lying BP: --/-- HR: --  Sitting BP: 111/77 HR: 90  Standing BP: 118/81 HR: 102  Site: --  Mode: --  Orthostatic VS  02-04-24 @ 19:29  Lying BP: --/-- HR: --  Sitting BP: 122/73 HR: 98  Standing BP: 132/72 HR: 109  Site: --  Mode: --  Orthostatic VS  02-04-24 @ 06:56  Lying BP: --/-- HR: --  Sitting BP: 114/80 HR: 87  Standing BP: 116/71 HR: 89  Site: upper left arm  Mode: electronic  Orthostatic VS  02-03-24 @ 23:55  Lying BP: --/-- HR: --  Sitting BP: 120/77 HR: 90  Standing BP: 127/71 HR: 94  Site: upper left arm  Mode: electronic

## 2024-02-05 NOTE — BH SOCIAL WORK INITIAL PSYCHOSOCIAL EVALUATION - OTHER PAST PSYCHIATRIC HISTORY (INCLUDE DETAILS REGARDING ONSET, COURSE OF ILLNESS, INPATIENT/OUTPATIENT TREATMENT)
Patient is a 28 year old male who lives with extended family, father, Charlie Kay, [phone]. The patient has multiple previous inpatient psychiatric hospitalizations, first at [facility] in 2014 and most recent at [facility], discharged on 2/2/24. He carries a diagnosis of Schizoaffective Disorder, Bipolar Type. Patient was compliant with medications after being discharged from [facility]. He continued to feel anxious with poor mood. He continued to have negative thoughts and felt unsafe at home. His friend sent him an Uber and he came to [facility] ED after going to [facility] and being discharged from the ED. He reported he was not having suicidal thoughts, but that if he did he would hang himself. Patient had fast speech, poor sleep, was agitated, standing up on occasion during the interview, and was religiously preoccupied. The patient has no reported history of substance abuse, aggression, trauma, or legal issues. Writer was unable to find reference to his outpatient providers. He has [employer].

## 2024-02-05 NOTE — BH INPATIENT PSYCHIATRY PROGRESS NOTE - NSBHMETABOLIC_PSY_ALL_CORE_FT
BMI: BMI (kg/m2): 33.7 (02-03-24 @ 23:55)  HbA1c: A1C with Estimated Average Glucose Result: 5.6 % (02-05-24 @ 09:00)    Glucose:   BP: 114/78 (02-03-24 @ 23:12) (114/78 - 121/74)Vital Signs Last 24 Hrs  T(C): 36.3 (02-05-24 @ 06:20), Max: 36.8 (02-04-24 @ 19:29)  T(F): 97.4 (02-05-24 @ 06:20), Max: 98.3 (02-04-24 @ 19:29)  HR: --  BP: --  BP(mean): --  RR: --  SpO2: --    Orthostatic VS  02-05-24 @ 06:20  Lying BP: --/-- HR: --  Sitting BP: 111/77 HR: 90  Standing BP: 118/81 HR: 102  Site: --  Mode: --  Orthostatic VS  02-04-24 @ 19:29  Lying BP: --/-- HR: --  Sitting BP: 122/73 HR: 98  Standing BP: 132/72 HR: 109  Site: --  Mode: --  Orthostatic VS  02-04-24 @ 06:56  Lying BP: --/-- HR: --  Sitting BP: 114/80 HR: 87  Standing BP: 116/71 HR: 89  Site: upper left arm  Mode: electronic  Orthostatic VS  02-03-24 @ 23:55  Lying BP: --/-- HR: --  Sitting BP: 120/77 HR: 90  Standing BP: 127/71 HR: 94  Site: upper left arm  Mode: electronic    Lipid Panel: Date/Time: 02-05-24 @ 09:00  Cholesterol, Serum: 135  LDL Cholesterol Calculated: 57  HDL Cholesterol, Serum: 48  Total Cholesterol/HDL Ration Measurement: --  Triglycerides, Serum: 149

## 2024-02-05 NOTE — ED BEHAVIORAL HEALTH NOTE - BEHAVIORAL HEALTH NOTE
[phone] and spoke to tyler who provided auth #[id number]. I faxed clinicals with [facility] follow up [phone]. There UR [phone]

## 2024-02-06 PROCEDURE — 99232 SBSQ HOSP IP/OBS MODERATE 35: CPT

## 2024-02-06 RX ADMIN — OLANZAPINE 20 MILLIGRAM(S): 15 TABLET, FILM COATED ORAL at 20:24

## 2024-02-06 RX ADMIN — DIVALPROEX SODIUM 1500 MILLIGRAM(S): 500 TABLET, DELAYED RELEASE ORAL at 20:24

## 2024-02-06 RX ADMIN — LITHIUM CARBONATE 900 MILLIGRAM(S): 300 TABLET, EXTENDED RELEASE ORAL at 20:24

## 2024-02-06 NOTE — BH INPATIENT PSYCHIATRY PROGRESS NOTE - NSBHASSESSSUMMFT_PSY_ALL_CORE
27yo M, single, unemployed, noncaregiver, domiciled with step-father/sister/uncle, with PPHx of documented Schizoaffective, bipolar type, at least 6 past hospitalizations last at [facility] 2 days ago, no known hx of suicidality or violence, no substance use, no relevant PMHx who presents to the ED after recent discharge from [facility] ED for worsening anxiety and SI.    The patient remains rather disorganized, overly bright.  However, slept well last night.  Behavior has been well controlled.  Denies SI today.  Continue prior medications, message left for patient's outpatient provider for collateral.    No 1:1 needed as the patient is calm, pleasant, happy to be getting help.  No active SI on the unit.  Restarted Zyprexa 20mg hs (was on 15mg bid)  Restarted Lithium 900mg hs (was on 600mg bid - but level high on admission)  Restarted VPA as ER 1500mg hs (was on 1000mg bid)  Obtain collateral from outpatient provider - message left  Encourage unit participation

## 2024-02-06 NOTE — BH INPATIENT PSYCHIATRY PROGRESS NOTE - NSBHMETABOLIC_PSY_ALL_CORE_FT
BMI: BMI (kg/m2): 33.7 (02-03-24 @ 23:55)  HbA1c: A1C with Estimated Average Glucose Result: 5.6 % (02-05-24 @ 09:00)    Glucose:   BP: 114/78 (02-03-24 @ 23:12) (114/78 - 121/74)Vital Signs Last 24 Hrs  T(C): 36.4 (02-06-24 @ 06:56), Max: 36.4 (02-06-24 @ 06:56)  T(F): 97.5 (02-06-24 @ 06:56), Max: 97.5 (02-06-24 @ 06:56)  HR: 102 (02-06-24 @ 07:44) (102 - 102)  BP: --  BP(mean): --  RR: 17 (02-06-24 @ 07:44) (17 - 17)  SpO2: 98% (02-06-24 @ 07:44) (98% - 98%)    Orthostatic VS  02-06-24 @ 06:56  Lying BP: --/-- HR: --  Sitting BP: 114/67 HR: 110  Standing BP: 139/90 HR: 125  Site: --  Mode: --  Orthostatic VS  02-05-24 @ 06:20  Lying BP: --/-- HR: --  Sitting BP: 111/77 HR: 90  Standing BP: 118/81 HR: 102  Site: --  Mode: --  Orthostatic VS  02-04-24 @ 19:29  Lying BP: --/-- HR: --  Sitting BP: 122/73 HR: 98  Standing BP: 132/72 HR: 109  Site: --  Mode: --    Lipid Panel: Date/Time: 02-05-24 @ 09:00  Cholesterol, Serum: 135  LDL Cholesterol Calculated: 57  HDL Cholesterol, Serum: 48  Total Cholesterol/HDL Ration Measurement: --  Triglycerides, Serum: 149

## 2024-02-06 NOTE — BH INPATIENT PSYCHIATRY PROGRESS NOTE - NSBHFUPINTERVALHXFT_PSY_A_CORE
Patient seen for follow up of sophie, chart reviewed, and case discussed with treatment team.  no events reported overnight.  The patient remains overly bright and somewhat disorganized, but slept much better last night.  He states he is feeling better, less depressed, less anxious.  He denies any current SI.  Behavior has been well controlled on the unit.  The patient has been visible on the unit, social with peers, and attending groups.  He is eating well.  He has been compliant with medications, tolerating them well.

## 2024-02-06 NOTE — BH INPATIENT PSYCHIATRY PROGRESS NOTE - NSBHCHARTREVIEWVS_PSY_A_CORE FT
Vital Signs Last 24 Hrs  T(C): 36.4 (02-06-24 @ 06:56), Max: 36.4 (02-06-24 @ 06:56)  T(F): 97.5 (02-06-24 @ 06:56), Max: 97.5 (02-06-24 @ 06:56)  HR: 102 (02-06-24 @ 07:44) (102 - 102)  BP: --  BP(mean): --  RR: 17 (02-06-24 @ 07:44) (17 - 17)  SpO2: 98% (02-06-24 @ 07:44) (98% - 98%)    Orthostatic VS  02-06-24 @ 06:56  Lying BP: --/-- HR: --  Sitting BP: 114/67 HR: 110  Standing BP: 139/90 HR: 125  Site: --  Mode: --  Orthostatic VS  02-05-24 @ 06:20  Lying BP: --/-- HR: --  Sitting BP: 111/77 HR: 90  Standing BP: 118/81 HR: 102  Site: --  Mode: --  Orthostatic VS  02-04-24 @ 19:29  Lying BP: --/-- HR: --  Sitting BP: 122/73 HR: 98  Standing BP: 132/72 HR: 109  Site: --  Mode: --

## 2024-02-07 PROCEDURE — 99232 SBSQ HOSP IP/OBS MODERATE 35: CPT

## 2024-02-07 RX ADMIN — OLANZAPINE 20 MILLIGRAM(S): 15 TABLET, FILM COATED ORAL at 21:09

## 2024-02-07 RX ADMIN — DIVALPROEX SODIUM 1500 MILLIGRAM(S): 500 TABLET, DELAYED RELEASE ORAL at 21:08

## 2024-02-07 RX ADMIN — LITHIUM CARBONATE 900 MILLIGRAM(S): 300 TABLET, EXTENDED RELEASE ORAL at 21:08

## 2024-02-07 NOTE — BH INPATIENT PSYCHIATRY PROGRESS NOTE - NSBHASSESSSUMMFT_PSY_ALL_CORE
29yo M, single, unemployed, noncaregiver, domiciled with step-father/sister/uncle, with PPHx of documented Schizoaffective, bipolar type, at least 6 past hospitalizations last at [facility] 2 days ago, no known hx of suicidality or violence, no substance use, no relevant PMHx who presents to the ED after recent discharge from [facility] ED for worsening anxiety and SI.    The patient remains rather disorganized, overly bright.  However, sleeping better.  Behavior has been well controlled.  Denies SI.  Tolerating medications well, will continue, check levels at steady state.    No 1:1 needed as the patient is calm, pleasant, happy to be getting help.  No active SI on the unit.  Restarted Zyprexa 20mg hs (was on 15mg bid)  Restarted Lithium 900mg hs (was on 600mg bid - but level high on admission)  Restarted VPA as ER 1500mg hs (was on 1000mg bid)  Obtain collateral from outpatient provider - message left  Encourage unit participation

## 2024-02-07 NOTE — BH INPATIENT PSYCHIATRY PROGRESS NOTE - NSBHFUPINTERVALHXFT_PSY_A_CORE
Patient seen for follow up of sophie, chart reviewed, and case discussed with treatment team.  no events reported overnight.  The patient remains overly bright, talkative, and somewhat disorganized, but continues to show improvement.  He states his mood is better, less depressed, less anxious.  He denies any current SI.  Behavior has been well controlled on the unit.  The patient has been visible on the unit, social with peers, and attending groups.  He is eating well, sleeping better.  He has been compliant with medications, tolerating them well.

## 2024-02-07 NOTE — BH INPATIENT PSYCHIATRY PROGRESS NOTE - NSBHMETABOLIC_PSY_ALL_CORE_FT
BMI: BMI (kg/m2): 33.7 (02-03-24 @ 23:55)  HbA1c: A1C with Estimated Average Glucose Result: 5.6 % (02-05-24 @ 09:00)    Glucose:   BP: --Vital Signs Last 24 Hrs  T(C): 36.2 (02-07-24 @ 06:53), Max: 36.2 (02-07-24 @ 06:53)  T(F): 97.2 (02-07-24 @ 06:53), Max: 97.2 (02-07-24 @ 06:53)  HR: --  BP: --  BP(mean): --  RR: --  SpO2: --    Orthostatic VS  02-07-24 @ 06:53  Lying BP: --/-- HR: --  Sitting BP: 111/73 HR: 95  Standing BP: 107/78 HR: 104  Site: --  Mode: --  Orthostatic VS  02-06-24 @ 06:56  Lying BP: --/-- HR: --  Sitting BP: 114/67 HR: 110  Standing BP: 139/90 HR: 125  Site: --  Mode: --    Lipid Panel: Date/Time: 02-05-24 @ 09:00  Cholesterol, Serum: 135  LDL Cholesterol Calculated: 57  HDL Cholesterol, Serum: 48  Total Cholesterol/HDL Ration Measurement: --  Triglycerides, Serum: 149

## 2024-02-07 NOTE — BH INPATIENT PSYCHIATRY PROGRESS NOTE - NSBHCHARTREVIEWVS_PSY_A_CORE FT
Vital Signs Last 24 Hrs  T(C): 36.2 (02-07-24 @ 06:53), Max: 36.2 (02-07-24 @ 06:53)  T(F): 97.2 (02-07-24 @ 06:53), Max: 97.2 (02-07-24 @ 06:53)  HR: --  BP: --  BP(mean): --  RR: --  SpO2: --    Orthostatic VS  02-07-24 @ 06:53  Lying BP: --/-- HR: --  Sitting BP: 111/73 HR: 95  Standing BP: 107/78 HR: 104  Site: --  Mode: --  Orthostatic VS  02-06-24 @ 06:56  Lying BP: --/-- HR: --  Sitting BP: 114/67 HR: 110  Standing BP: 139/90 HR: 125  Site: --  Mode: --

## 2024-02-08 LAB

## 2024-02-08 PROCEDURE — 99232 SBSQ HOSP IP/OBS MODERATE 35: CPT

## 2024-02-08 RX ORDER — OLANZAPINE 15 MG/1
15 TABLET, FILM COATED ORAL AT BEDTIME
Refills: 0 | Status: DISCONTINUED | OUTPATIENT
Start: 2024-02-08 | End: 2024-02-12

## 2024-02-08 RX ORDER — PALIPERIDONE 1.5 MG/1
234 TABLET, EXTENDED RELEASE ORAL
Qty: 1 | Refills: 0
Start: 2024-02-08

## 2024-02-08 RX ORDER — RISPERIDONE 4 MG/1
2 TABLET ORAL AT BEDTIME
Refills: 0 | Status: DISCONTINUED | OUTPATIENT
Start: 2024-02-08 | End: 2024-02-12

## 2024-02-08 RX ADMIN — LITHIUM CARBONATE 900 MILLIGRAM(S): 300 TABLET, EXTENDED RELEASE ORAL at 20:41

## 2024-02-08 RX ADMIN — DIVALPROEX SODIUM 1500 MILLIGRAM(S): 500 TABLET, DELAYED RELEASE ORAL at 20:41

## 2024-02-08 RX ADMIN — QUETIAPINE FUMARATE 50 MILLIGRAM(S): 200 TABLET, FILM COATED ORAL at 20:41

## 2024-02-08 RX ADMIN — RISPERIDONE 2 MILLIGRAM(S): 4 TABLET ORAL at 20:41

## 2024-02-08 RX ADMIN — OLANZAPINE 15 MILLIGRAM(S): 15 TABLET, FILM COATED ORAL at 20:41

## 2024-02-08 NOTE — BH INPATIENT PSYCHIATRY PROGRESS NOTE - NSBHASSESSSUMMFT_PSY_ALL_CORE
29yo M, single, unemployed, noncaregiver, domiciled with step-father/sister/uncle, with PPHx of documented Schizoaffective, bipolar type, at least 6 past hospitalizations last at [facility] 2 days ago, no known hx of suicidality or violence, no substance use, no relevant PMHx who presents to the ED after recent discharge from [facility] ED for worsening anxiety and SI.    The patient remains bright, but showing some improvement, more organized, sleeping better.  Behavior has been well controlled.  Denies SI.  Tolerating medications well, check levels at steady state.  Will check insurance coverage for Invega OLIVIA - if covered will start to switch Zyprexa to Risperdal.    No 1:1 needed as the patient is calm, pleasant, happy to be getting help.  No active SI on the unit.  Restarted Zyprexa 20mg hs (was on 15mg bid)  Restarted Lithium 900mg hs (was on 600mg bid - but level high on admission)  Restarted VPA as ER 1500mg hs (was on 1000mg bid)  Obtain collateral from outpatient provider - message left  Encourage unit participation

## 2024-02-08 NOTE — BH INPATIENT PSYCHIATRY PROGRESS NOTE - NSBHCHARTREVIEWVS_PSY_A_CORE FT
Vital Signs Last 24 Hrs  T(C): 36.8 (02-08-24 @ 08:43), Max: 36.8 (02-08-24 @ 08:43)  T(F): 98.2 (02-08-24 @ 08:43), Max: 98.2 (02-08-24 @ 08:43)  HR: --  BP: --  BP(mean): --  RR: --  SpO2: --    Orthostatic VS  02-08-24 @ 08:43  Lying BP: --/-- HR: --  Sitting BP: 101/71 HR: 101  Standing BP: 101/62 HR: 99  Site: --  Mode: --  Orthostatic VS  02-07-24 @ 06:53  Lying BP: --/-- HR: --  Sitting BP: 111/73 HR: 95  Standing BP: 107/78 HR: 104  Site: --  Mode: --

## 2024-02-08 NOTE — BH INPATIENT PSYCHIATRY PROGRESS NOTE - NSBHFUPINTERVALHXFT_PSY_A_CORE
Patient seen for follow up of sophie, chart reviewed, and case discussed with treatment team.  No events reported overnight.  The patient remains bright, talkative, but more organized, showing improvement.  He complains of some URI symptoms this morning.  RVP swab sent, pending.  He states his mood remains better, less depressed, less anxious.  He denies any current SI.  Behavior has been well controlled on the unit.  The patient has been visible on the unit, social with peers, and attending groups.  He is eating well, sleeping better.  He has been compliant with medications, tolerating them well.  Discussed changing Zyprexa to Risperdal for OLIVIA, and patient was in agreement.    Spoke with the patient's outpatient provider, who verified the patient has been in and out of hospitals for the past few months.  There is some questions of compliance, possibly not taking medications correctly.  We discussed that the patient would likely benefit from OLIVIA, but he has refused in the past.

## 2024-02-08 NOTE — BH INPATIENT PSYCHIATRY PROGRESS NOTE - NSBHMETABOLIC_PSY_ALL_CORE_FT
BMI: BMI (kg/m2): 33.7 (02-03-24 @ 23:55)  HbA1c: A1C with Estimated Average Glucose Result: 5.6 % (02-05-24 @ 09:00)    Glucose:   BP: --Vital Signs Last 24 Hrs  T(C): 36.8 (02-08-24 @ 08:43), Max: 36.8 (02-08-24 @ 08:43)  T(F): 98.2 (02-08-24 @ 08:43), Max: 98.2 (02-08-24 @ 08:43)  HR: --  BP: --  BP(mean): --  RR: --  SpO2: --    Orthostatic VS  02-08-24 @ 08:43  Lying BP: --/-- HR: --  Sitting BP: 101/71 HR: 101  Standing BP: 101/62 HR: 99  Site: --  Mode: --  Orthostatic VS  02-07-24 @ 06:53  Lying BP: --/-- HR: --  Sitting BP: 111/73 HR: 95  Standing BP: 107/78 HR: 104  Site: --  Mode: --    Lipid Panel: Date/Time: 02-05-24 @ 09:00  Cholesterol, Serum: 135  LDL Cholesterol Calculated: 57  HDL Cholesterol, Serum: 48  Total Cholesterol/HDL Ration Measurement: --  Triglycerides, Serum: 149

## 2024-02-09 PROCEDURE — 99232 SBSQ HOSP IP/OBS MODERATE 35: CPT

## 2024-02-09 RX ADMIN — RISPERIDONE 2 MILLIGRAM(S): 4 TABLET ORAL at 20:26

## 2024-02-09 RX ADMIN — DIVALPROEX SODIUM 1500 MILLIGRAM(S): 500 TABLET, DELAYED RELEASE ORAL at 20:26

## 2024-02-09 RX ADMIN — OLANZAPINE 15 MILLIGRAM(S): 15 TABLET, FILM COATED ORAL at 20:26

## 2024-02-09 RX ADMIN — LITHIUM CARBONATE 900 MILLIGRAM(S): 300 TABLET, EXTENDED RELEASE ORAL at 20:26

## 2024-02-09 NOTE — BH INPATIENT PSYCHIATRY PROGRESS NOTE - NSBHFUPINTERVALHXFT_PSY_A_CORE
Patient seen for follow up of sophie, chart reviewed, and case discussed with treatment team.  No events reported overnight.  The patient is under the weather today, lying in bed.  But he is remaining hydrated.  RVP positive for common coronavirus.  He otherwise states he is doing okay, less depressed, denies any SI.  Behavior remains well controlled.  He is eating well, sleeping well.  He has been compliant with medications, tolerating them well.

## 2024-02-09 NOTE — BH INPATIENT PSYCHIATRY PROGRESS NOTE - NSBHASSESSSUMMFT_PSY_ALL_CORE
27yo M, single, unemployed, noncaregiver, domiciled with step-father/sister/uncle, with PPHx of documented Schizoaffective, bipolar type, at least 6 past hospitalizations last at [facility] 2 days ago, no known hx of suicidality or violence, no substance use, no relevant PMHx who presents to the ED after recent discharge from [facility] ED for worsening anxiety and SI.    The patient is under the weather today, but mood remains improved, more stable, denying SI.  Tolerating cross-titration well thus far.  Check Li and VPA levels tomorrow.    No 1:1 needed as the patient is calm, pleasant, happy to be getting help.  No active SI on the unit.  Decreased Zyprexa to 15mg hs, plan to stop  Restarted Lithium 900mg hs (was on 600mg bid - but level high on admission)  Restarted VPA as ER 1500mg hs (was on 1000mg bid)  Started Risperdal 2mg hs with plan to titrate and plan for Invega OLIVIA  Obtain collateral from outpatient provider - message left  Encourage unit participation

## 2024-02-09 NOTE — BH INPATIENT PSYCHIATRY PROGRESS NOTE - NSBHCHARTREVIEWVS_PSY_A_CORE FT
Vital Signs Last 24 Hrs  T(C): 36.4 (02-09-24 @ 07:48), Max: 36.4 (02-09-24 @ 07:48)  T(F): 97.6 (02-09-24 @ 07:48), Max: 97.6 (02-09-24 @ 07:48)  HR: --  BP: --  BP(mean): --  RR: --  SpO2: --    Orthostatic VS  02-09-24 @ 07:48  Lying BP: --/-- HR: --  Sitting BP: 99/70 HR: 94  Standing BP: 107/65 HR: 104  Site: upper left arm  Mode: electronic  Orthostatic VS  02-08-24 @ 08:43  Lying BP: --/-- HR: --  Sitting BP: 101/71 HR: 101  Standing BP: 101/62 HR: 99  Site: --  Mode: --

## 2024-02-09 NOTE — BH INPATIENT PSYCHIATRY PROGRESS NOTE - NSBHMETABOLIC_PSY_ALL_CORE_FT
BMI: BMI (kg/m2): 33.7 (02-03-24 @ 23:55)  HbA1c: A1C with Estimated Average Glucose Result: 5.6 % (02-05-24 @ 09:00)    Glucose:   BP: --Vital Signs Last 24 Hrs  T(C): 36.4 (02-09-24 @ 07:48), Max: 36.4 (02-09-24 @ 07:48)  T(F): 97.6 (02-09-24 @ 07:48), Max: 97.6 (02-09-24 @ 07:48)  HR: --  BP: --  BP(mean): --  RR: --  SpO2: --    Orthostatic VS  02-09-24 @ 07:48  Lying BP: --/-- HR: --  Sitting BP: 99/70 HR: 94  Standing BP: 107/65 HR: 104  Site: upper left arm  Mode: electronic  Orthostatic VS  02-08-24 @ 08:43  Lying BP: --/-- HR: --  Sitting BP: 101/71 HR: 101  Standing BP: 101/62 HR: 99  Site: --  Mode: --    Lipid Panel: Date/Time: 02-05-24 @ 09:00  Cholesterol, Serum: 135  LDL Cholesterol Calculated: 57  HDL Cholesterol, Serum: 48  Total Cholesterol/HDL Ration Measurement: --  Triglycerides, Serum: 149

## 2024-02-10 LAB
ALBUMIN SERPL ELPH-MCNC: 4.1 G/DL — SIGNIFICANT CHANGE UP (ref 3.3–5)
ALP SERPL-CCNC: 80 U/L — SIGNIFICANT CHANGE UP (ref 40–120)
ALT FLD-CCNC: 26 U/L — SIGNIFICANT CHANGE UP (ref 4–41)
ANION GAP SERPL CALC-SCNC: 10 MMOL/L — SIGNIFICANT CHANGE UP (ref 7–14)
AST SERPL-CCNC: 23 U/L — SIGNIFICANT CHANGE UP (ref 4–40)
BASOPHILS # BLD AUTO: 0.04 K/UL — SIGNIFICANT CHANGE UP (ref 0–0.2)
BASOPHILS NFR BLD AUTO: 0.5 % — SIGNIFICANT CHANGE UP (ref 0–2)
BILIRUB SERPL-MCNC: 0.2 MG/DL — SIGNIFICANT CHANGE UP (ref 0.2–1.2)
BUN SERPL-MCNC: 10 MG/DL — SIGNIFICANT CHANGE UP (ref 7–23)
CALCIUM SERPL-MCNC: 9.5 MG/DL — SIGNIFICANT CHANGE UP (ref 8.4–10.5)
CHLORIDE SERPL-SCNC: 101 MMOL/L — SIGNIFICANT CHANGE UP (ref 98–107)
CO2 SERPL-SCNC: 27 MMOL/L — SIGNIFICANT CHANGE UP (ref 22–31)
CREAT SERPL-MCNC: 0.97 MG/DL — SIGNIFICANT CHANGE UP (ref 0.5–1.3)
EGFR: 110 ML/MIN/1.73M2 — SIGNIFICANT CHANGE UP
EOSINOPHIL # BLD AUTO: 0.54 K/UL — HIGH (ref 0–0.5)
EOSINOPHIL NFR BLD AUTO: 6.2 % — HIGH (ref 0–6)
GLUCOSE SERPL-MCNC: 93 MG/DL — SIGNIFICANT CHANGE UP (ref 70–99)
HCT VFR BLD CALC: 43.7 % — SIGNIFICANT CHANGE UP (ref 39–50)
HGB BLD-MCNC: 14.4 G/DL — SIGNIFICANT CHANGE UP (ref 13–17)
IANC: 5.11 K/UL — SIGNIFICANT CHANGE UP (ref 1.8–7.4)
IMM GRANULOCYTES NFR BLD AUTO: 0.2 % — SIGNIFICANT CHANGE UP (ref 0–0.9)
LITHIUM SERPL-MCNC: 0.6 MMOL/L — SIGNIFICANT CHANGE UP (ref 0.6–1.2)
LYMPHOCYTES # BLD AUTO: 2.16 K/UL — SIGNIFICANT CHANGE UP (ref 1–3.3)
LYMPHOCYTES # BLD AUTO: 24.9 % — SIGNIFICANT CHANGE UP (ref 13–44)
MCHC RBC-ENTMCNC: 29.1 PG — SIGNIFICANT CHANGE UP (ref 27–34)
MCHC RBC-ENTMCNC: 33 GM/DL — SIGNIFICANT CHANGE UP (ref 32–36)
MCV RBC AUTO: 88.5 FL — SIGNIFICANT CHANGE UP (ref 80–100)
MONOCYTES # BLD AUTO: 0.8 K/UL — SIGNIFICANT CHANGE UP (ref 0–0.9)
MONOCYTES NFR BLD AUTO: 9.2 % — SIGNIFICANT CHANGE UP (ref 2–14)
NEUTROPHILS # BLD AUTO: 5.11 K/UL — SIGNIFICANT CHANGE UP (ref 1.8–7.4)
NEUTROPHILS NFR BLD AUTO: 59 % — SIGNIFICANT CHANGE UP (ref 43–77)
NRBC # BLD: 0 /100 WBCS — SIGNIFICANT CHANGE UP (ref 0–0)
NRBC # FLD: 0 K/UL — SIGNIFICANT CHANGE UP (ref 0–0)
PLATELET # BLD AUTO: 333 K/UL — SIGNIFICANT CHANGE UP (ref 150–400)
POTASSIUM SERPL-MCNC: 3.9 MMOL/L — SIGNIFICANT CHANGE UP (ref 3.5–5.3)
POTASSIUM SERPL-SCNC: 3.9 MMOL/L — SIGNIFICANT CHANGE UP (ref 3.5–5.3)
PROT SERPL-MCNC: 7.4 G/DL — SIGNIFICANT CHANGE UP (ref 6–8.3)
RBC # BLD: 4.94 M/UL — SIGNIFICANT CHANGE UP (ref 4.2–5.8)
RBC # FLD: 12.7 % — SIGNIFICANT CHANGE UP (ref 10.3–14.5)
SODIUM SERPL-SCNC: 138 MMOL/L — SIGNIFICANT CHANGE UP (ref 135–145)
VALPROATE SERPL-MCNC: 65.2 UG/ML — SIGNIFICANT CHANGE UP (ref 50–100)
WBC # BLD: 8.67 K/UL — SIGNIFICANT CHANGE UP (ref 3.8–10.5)
WBC # FLD AUTO: 8.67 K/UL — SIGNIFICANT CHANGE UP (ref 3.8–10.5)

## 2024-02-10 PROCEDURE — 99232 SBSQ HOSP IP/OBS MODERATE 35: CPT

## 2024-02-10 RX ADMIN — RISPERIDONE 2 MILLIGRAM(S): 4 TABLET ORAL at 20:58

## 2024-02-10 RX ADMIN — DIVALPROEX SODIUM 1500 MILLIGRAM(S): 500 TABLET, DELAYED RELEASE ORAL at 20:58

## 2024-02-10 RX ADMIN — LITHIUM CARBONATE 900 MILLIGRAM(S): 300 TABLET, EXTENDED RELEASE ORAL at 20:58

## 2024-02-10 RX ADMIN — OLANZAPINE 15 MILLIGRAM(S): 15 TABLET, FILM COATED ORAL at 20:58

## 2024-02-10 NOTE — BH INPATIENT PSYCHIATRY PROGRESS NOTE - NSBHMETABOLIC_PSY_ALL_CORE_FT
BMI: BMI (kg/m2): 33.7 (02-03-24 @ 23:55)  HbA1c: A1C with Estimated Average Glucose Result: 5.6 % (02-05-24 @ 09:00)    Glucose:   BP: --Vital Signs Last 24 Hrs  T(C): 36.3 (02-10-24 @ 08:13), Max: 36.3 (02-10-24 @ 08:13)  T(F): 97.4 (02-10-24 @ 08:13), Max: 97.4 (02-10-24 @ 08:13)  HR: --  BP: --  BP(mean): --  RR: --  SpO2: --    Orthostatic VS  02-10-24 @ 08:13  Lying BP: --/-- HR: --  Sitting BP: 106/74 HR: 77  Standing BP: 104/72 HR: 95  Site: --  Mode: --  Orthostatic VS  02-09-24 @ 07:48  Lying BP: --/-- HR: --  Sitting BP: 99/70 HR: 94  Standing BP: 107/65 HR: 104  Site: upper left arm  Mode: electronic    Lipid Panel: Date/Time: 02-05-24 @ 09:00  Cholesterol, Serum: 135  LDL Cholesterol Calculated: 57  HDL Cholesterol, Serum: 48  Total Cholesterol/HDL Ration Measurement: --  Triglycerides, Serum: 149

## 2024-02-10 NOTE — BH INPATIENT PSYCHIATRY PROGRESS NOTE - NSBHCHARTREVIEWVS_PSY_A_CORE FT
Vital Signs Last 24 Hrs  T(C): 36.3 (02-10-24 @ 08:13), Max: 36.3 (02-10-24 @ 08:13)  T(F): 97.4 (02-10-24 @ 08:13), Max: 97.4 (02-10-24 @ 08:13)  HR: --  BP: --  BP(mean): --  RR: --  SpO2: --    Orthostatic VS  02-10-24 @ 08:13  Lying BP: --/-- HR: --  Sitting BP: 106/74 HR: 77  Standing BP: 104/72 HR: 95  Site: --  Mode: --  Orthostatic VS  02-09-24 @ 07:48  Lying BP: --/-- HR: --  Sitting BP: 99/70 HR: 94  Standing BP: 107/65 HR: 104  Site: upper left arm  Mode: electronic

## 2024-02-10 NOTE — BH INPATIENT PSYCHIATRY PROGRESS NOTE - NSBHASSESSSUMMFT_PSY_ALL_CORE
29yo M, single, unemployed, noncaregiver, domiciled with step-father/sister/uncle, with PPHx of documented Schizoaffective, bipolar type, at least 6 past hospitalizations last at [facility] 2 days ago, no known hx of suicidality or violence, no substance use, no relevant PMHx who presents to the ED after recent discharge from [facility] ED for worsening anxiety and SI.    The patient is under the weather today, but mood remains improved, more stable, denying SI.  Tolerating cross-titration well thus far.  Check Li and VPA levels tomorrow.    No 1:1 needed as the patient is calm, pleasant, happy to be getting help.  No active SI on the unit.  Decreased Zyprexa to 15mg hs, plan to stop  Restarted Lithium 900mg hs (was on 600mg bid - but level high on admission)  Restarted VPA as ER 1500mg hs (was on 1000mg bid)  Started Risperdal 2mg hs with plan to titrate and plan for Invega OLIVIA  Obtain collateral from outpatient provider - message left  Encourage unit participation

## 2024-02-10 NOTE — BH INPATIENT PSYCHIATRY PROGRESS NOTE - NSBHFUPINTERVALHXFT_PSY_A_CORE
Patient is seen for sophie.  Chart, medications reviewed. Patient is discussed with nursing team, no interval events.  Patient compliant with medications, no SE reported.  Has been in fair behavioral control no prns given for aggression.     Patient is observed in his room, presents calm, pleasant and cooperative.  Patient reports feeling  “doing better my medications are working”  He has been under the weather, RVP positive for coronavirus, reports feeling better.    Patient reports mood has improved, less depressed,  denies any dysregulated mood, no anxiety, no SI/SIB/HI, engages in safety planning.      No overt psychotic trend, denies AVH, decreased sophie.  Acute medical issues: common coronavirus. Patient is scheduled for labs today VPA and Lithium, results pending. Continue to monitor and provide therapeutic support. VSS: 97.4, 106/74, 77

## 2024-02-11 PROCEDURE — 99232 SBSQ HOSP IP/OBS MODERATE 35: CPT

## 2024-02-11 RX ADMIN — LITHIUM CARBONATE 900 MILLIGRAM(S): 300 TABLET, EXTENDED RELEASE ORAL at 20:39

## 2024-02-11 RX ADMIN — OLANZAPINE 15 MILLIGRAM(S): 15 TABLET, FILM COATED ORAL at 20:39

## 2024-02-11 RX ADMIN — DIVALPROEX SODIUM 1500 MILLIGRAM(S): 500 TABLET, DELAYED RELEASE ORAL at 20:39

## 2024-02-11 RX ADMIN — RISPERIDONE 2 MILLIGRAM(S): 4 TABLET ORAL at 20:39

## 2024-02-11 NOTE — BH INPATIENT PSYCHIATRY PROGRESS NOTE - NSBHMETABOLIC_PSY_ALL_CORE_FT
BMI: BMI (kg/m2): 33.7 (02-03-24 @ 23:55)  HbA1c: A1C with Estimated Average Glucose Result: 5.6 % (02-05-24 @ 09:00)    Glucose:   BP: --Vital Signs Last 24 Hrs  T(C): 36.3 (02-10-24 @ 08:13), Max: 36.3 (02-10-24 @ 08:13)  T(F): 97.4 (02-10-24 @ 08:13), Max: 97.4 (02-10-24 @ 08:13)  HR: --  BP: --  BP(mean): --  RR: --  SpO2: --    Orthostatic VS  02-10-24 @ 08:13  Lying BP: --/-- HR: --  Sitting BP: 106/74 HR: 77  Standing BP: 104/72 HR: 95  Site: --  Mode: --  Orthostatic VS  02-09-24 @ 07:48  Lying BP: --/-- HR: --  Sitting BP: 99/70 HR: 94  Standing BP: 107/65 HR: 104  Site: upper left arm  Mode: electronic    Lipid Panel: Date/Time: 02-05-24 @ 09:00  Cholesterol, Serum: 135  LDL Cholesterol Calculated: 57  HDL Cholesterol, Serum: 48  Total Cholesterol/HDL Ration Measurement: --  Triglycerides, Serum: 149   BMI: BMI (kg/m2): 33.7 (02-03-24 @ 23:55)  HbA1c: A1C with Estimated Average Glucose Result: 5.6 % (02-05-24 @ 09:00)    Glucose:   BP: --Vital Signs Last 24 Hrs  T(C): 36.3 (02-11-24 @ 08:24), Max: 36.3 (02-11-24 @ 08:24)  T(F): 97.3 (02-11-24 @ 08:24), Max: 97.3 (02-11-24 @ 08:24)  HR: --  BP: --  BP(mean): --  RR: --  SpO2: --    Orthostatic VS  02-11-24 @ 08:24  Lying BP: --/-- HR: --  Sitting BP: 109/60 HR: 76  Standing BP: 102/62 HR: 100  Site: --  Mode: --  Orthostatic VS  02-10-24 @ 08:13  Lying BP: --/-- HR: --  Sitting BP: 106/74 HR: 77  Standing BP: 104/72 HR: 95  Site: --  Mode: --    Lipid Panel: Date/Time: 02-05-24 @ 09:00  Cholesterol, Serum: 135  LDL Cholesterol Calculated: 57  HDL Cholesterol, Serum: 48  Total Cholesterol/HDL Ration Measurement: --  Triglycerides, Serum: 149

## 2024-02-11 NOTE — BH INPATIENT PSYCHIATRY PROGRESS NOTE - NSBHCHARTREVIEWVS_PSY_A_CORE FT
Vital Signs Last 24 Hrs  T(C): 36.3 (02-10-24 @ 08:13), Max: 36.3 (02-10-24 @ 08:13)  T(F): 97.4 (02-10-24 @ 08:13), Max: 97.4 (02-10-24 @ 08:13)  HR: --  BP: --  BP(mean): --  RR: --  SpO2: --    Orthostatic VS  02-10-24 @ 08:13  Lying BP: --/-- HR: --  Sitting BP: 106/74 HR: 77  Standing BP: 104/72 HR: 95  Site: --  Mode: --  Orthostatic VS  02-09-24 @ 07:48  Lying BP: --/-- HR: --  Sitting BP: 99/70 HR: 94  Standing BP: 107/65 HR: 104  Site: upper left arm  Mode: electronic   Vital Signs Last 24 Hrs  T(C): 36.3 (02-11-24 @ 08:24), Max: 36.3 (02-11-24 @ 08:24)  T(F): 97.3 (02-11-24 @ 08:24), Max: 97.3 (02-11-24 @ 08:24)  HR: --  BP: --  BP(mean): --  RR: --  SpO2: --    Orthostatic VS  02-11-24 @ 08:24  Lying BP: --/-- HR: --  Sitting BP: 109/60 HR: 76  Standing BP: 102/62 HR: 100  Site: --  Mode: --  Orthostatic VS  02-10-24 @ 08:13  Lying BP: --/-- HR: --  Sitting BP: 106/74 HR: 77  Standing BP: 104/72 HR: 95  Site: --  Mode: --

## 2024-02-11 NOTE — BH INPATIENT PSYCHIATRY PROGRESS NOTE - NSBHASSESSSUMMFT_PSY_ALL_CORE
27yo M, single, unemployed, noncaregiver, domiciled with step-father/sister/uncle, with PPHx of documented Schizoaffective, bipolar type, at least 6 past hospitalizations last at [facility] 2 days ago, no known hx of suicidality or violence, no substance use, no relevant PMHx who presents to the ED after recent discharge from [facility] ED for worsening anxiety and SI.    The patient is under the weather today, but mood remains improved, more stable, denying SI.  Tolerating cross-titration well thus far.  Check Li and VPA levels tomorrow.    No 1:1 needed as the patient is calm, pleasant, happy to be getting help.  No active SI on the unit.  Decreased Zyprexa to 15mg hs, plan to stop  Restarted Lithium 900mg hs (was on 600mg bid - but level high on admission)  Restarted VPA as ER 1500mg hs (was on 1000mg bid)  Started Risperdal 2mg hs with plan to titrate and plan for Invega OLIVIA  Obtain collateral from outpatient provider - message left  Encourage unit participation

## 2024-02-11 NOTE — BH INPATIENT PSYCHIATRY PROGRESS NOTE - NSBHFUPINTERVALHXFT_PSY_A_CORE
Patient is seen for sophie.  Chart, medications reviewed. Patient is discussed with nursing team, no interval events.  Patient compliant with medications, no SE reported.  Recent bloodwork for VPA and Lithium resulted WNL (VPA 65.20 and Lithium 0.3).  Patient remains in fair behavioral control no prns given for aggression.     Patient is observed in his room, presents calm, pleasant and cooperative.  Patient reports feeling  “good”  Patient reports mood has improved, less depressed,  denies any dysregulated mood, no anxiety, no SI/SIB/HI, engages in safety planning. Patient reports he feels medications are working well for him, denies SE states “medications make me eat a lot”     No overt psychotic trend, denies AVH, delusions,  sophie.  Acute medical issues: common coronavirus “I still feel a little sick”. No pain/discomfort.  Continue to monitor and provide therapeutic support. VSS: 97.3, 109/60, 76

## 2024-02-12 PROCEDURE — 99232 SBSQ HOSP IP/OBS MODERATE 35: CPT

## 2024-02-12 RX ORDER — OLANZAPINE 15 MG/1
10 TABLET, FILM COATED ORAL AT BEDTIME
Refills: 0 | Status: DISCONTINUED | OUTPATIENT
Start: 2024-02-12 | End: 2024-02-13

## 2024-02-12 RX ORDER — RISPERIDONE 4 MG/1
3 TABLET ORAL AT BEDTIME
Refills: 0 | Status: DISCONTINUED | OUTPATIENT
Start: 2024-02-12 | End: 2024-02-22

## 2024-02-12 RX ADMIN — RISPERIDONE 3 MILLIGRAM(S): 4 TABLET ORAL at 20:11

## 2024-02-12 RX ADMIN — LITHIUM CARBONATE 900 MILLIGRAM(S): 300 TABLET, EXTENDED RELEASE ORAL at 20:11

## 2024-02-12 RX ADMIN — DIVALPROEX SODIUM 1500 MILLIGRAM(S): 500 TABLET, DELAYED RELEASE ORAL at 20:10

## 2024-02-12 RX ADMIN — OLANZAPINE 10 MILLIGRAM(S): 15 TABLET, FILM COATED ORAL at 20:11

## 2024-02-12 NOTE — BH INPATIENT PSYCHIATRY PROGRESS NOTE - NSBHMETABOLIC_PSY_ALL_CORE_FT
BMI: BMI (kg/m2): 33.7 (02-03-24 @ 23:55)  HbA1c: A1C with Estimated Average Glucose Result: 5.6 % (02-05-24 @ 09:00)    Glucose:   BP: --Vital Signs Last 24 Hrs  T(C): 36.1 (02-12-24 @ 08:12), Max: 36.1 (02-12-24 @ 08:12)  T(F): 96.9 (02-12-24 @ 08:12), Max: 96.9 (02-12-24 @ 08:12)  HR: --  BP: --  BP(mean): --  RR: --  SpO2: --    Orthostatic VS  02-12-24 @ 08:12  Lying BP: --/-- HR: --  Sitting BP: 101/60 HR: 85  Standing BP: 98/62 HR: 80  Site: --  Mode: --  Orthostatic VS  02-11-24 @ 08:24  Lying BP: --/-- HR: --  Sitting BP: 109/60 HR: 76  Standing BP: 102/62 HR: 100  Site: --  Mode: --    Lipid Panel: Date/Time: 02-05-24 @ 09:00  Cholesterol, Serum: 135  LDL Cholesterol Calculated: 57  HDL Cholesterol, Serum: 48  Total Cholesterol/HDL Ration Measurement: --  Triglycerides, Serum: 149

## 2024-02-12 NOTE — BH INPATIENT PSYCHIATRY PROGRESS NOTE - NSBHFUPINTERVALHXFT_PSY_A_CORE
f/up SAD, care discussed w/ tx team, VSS. no overnight issues. Patient reported that he felt ready to go home, reported that he liked the care he is receiving. Patient reported that he was suicidal on admission and he no longer feels that way. Patient reported fair sleep and appetite. denied dizziness or constipation. Observed to be in room mostly in AM--reported that he had a cold.

## 2024-02-12 NOTE — BH INPATIENT PSYCHIATRY PROGRESS NOTE - NSBHCHARTREVIEWVS_PSY_A_CORE FT
Vital Signs Last 24 Hrs  T(C): 36.1 (02-12-24 @ 08:12), Max: 36.1 (02-12-24 @ 08:12)  T(F): 96.9 (02-12-24 @ 08:12), Max: 96.9 (02-12-24 @ 08:12)  HR: --  BP: --  BP(mean): --  RR: --  SpO2: --    Orthostatic VS  02-12-24 @ 08:12  Lying BP: --/-- HR: --  Sitting BP: 101/60 HR: 85  Standing BP: 98/62 HR: 80  Site: --  Mode: --  Orthostatic VS  02-11-24 @ 08:24  Lying BP: --/-- HR: --  Sitting BP: 109/60 HR: 76  Standing BP: 102/62 HR: 100  Site: --  Mode: --

## 2024-02-12 NOTE — BH INPATIENT PSYCHIATRY PROGRESS NOTE - NSBHASSESSSUMMFT_PSY_ALL_CORE
29yo M, single, unemployed, noncaregiver, domiciled with step-father/sister/uncle, with PPHx of documented Schizoaffective, bipolar type, at least 6 past hospitalizations last at [facility] 2 days ago, no known hx of suicidality or violence, no substance use, no relevant PMHx who presents to the ED after recent discharge from [facility] ED for worsening anxiety and SI.    on assessment, patient is overly bright, calm, keeping to himself in room, will reorder lithium and VPA levels.     No 1:1 needed as the patient is calm, pleasant, happy to be getting help.  No active SI on the unit.  Decreased Zyprexa to 10 mg hs, plan to stop  Restarted Lithium 900mg hs (was on 600mg bid - but level high on admission)  Restarted VPA as ER 1500mg hs (was on 1000mg bid)  increase  Risperdal 3 mg hs with plan to titrate and plan for Invega OLIVIA  Obtain collateral from outpatient provider - message left  Encourage unit participation

## 2024-02-13 PROCEDURE — 99232 SBSQ HOSP IP/OBS MODERATE 35: CPT

## 2024-02-13 RX ORDER — OLANZAPINE 15 MG/1
7.5 TABLET, FILM COATED ORAL AT BEDTIME
Refills: 0 | Status: DISCONTINUED | OUTPATIENT
Start: 2024-02-13 | End: 2024-02-14

## 2024-02-13 RX ADMIN — DIVALPROEX SODIUM 1500 MILLIGRAM(S): 500 TABLET, DELAYED RELEASE ORAL at 20:12

## 2024-02-13 RX ADMIN — LITHIUM CARBONATE 900 MILLIGRAM(S): 300 TABLET, EXTENDED RELEASE ORAL at 20:12

## 2024-02-13 RX ADMIN — RISPERIDONE 3 MILLIGRAM(S): 4 TABLET ORAL at 20:12

## 2024-02-13 RX ADMIN — OLANZAPINE 7.5 MILLIGRAM(S): 15 TABLET, FILM COATED ORAL at 20:12

## 2024-02-13 NOTE — BH INPATIENT PSYCHIATRY PROGRESS NOTE - NSBHFUPINTERVALHXFT_PSY_A_CORE
f/up SAD, care discussed w/ tx team, VSS. no overnight issues. Patient observed to be isolative to room, resistant to come out of bed for interview, is superficially engaged in interaction, focused on discharge, tolerating medication changes. no td, eps or tremors observed/ reported. denied dizziness. Patient not attending groups.

## 2024-02-13 NOTE — BH INPATIENT PSYCHIATRY PROGRESS NOTE - NSBHMETABOLIC_PSY_ALL_CORE_FT
BMI: BMI (kg/m2): 33.7 (02-03-24 @ 23:55)  HbA1c: A1C with Estimated Average Glucose Result: 5.6 % (02-05-24 @ 09:00)    Glucose:   BP: --Vital Signs Last 24 Hrs  T(C): 37.1 (02-13-24 @ 08:38), Max: 37.1 (02-13-24 @ 08:38)  T(F): 98.7 (02-13-24 @ 08:38), Max: 98.7 (02-13-24 @ 08:38)  HR: --  BP: --  BP(mean): --  RR: --  SpO2: --    Orthostatic VS  02-13-24 @ 08:38  Lying BP: --/-- HR: --  Sitting BP: 105/74 HR: 97  Standing BP: 103/72 HR: 96  Site: --  Mode: --  Orthostatic VS  02-12-24 @ 08:12  Lying BP: --/-- HR: --  Sitting BP: 101/60 HR: 85  Standing BP: 98/62 HR: 80  Site: --  Mode: --    Lipid Panel: Date/Time: 02-05-24 @ 09:00  Cholesterol, Serum: 135  LDL Cholesterol Calculated: 57  HDL Cholesterol, Serum: 48  Total Cholesterol/HDL Ration Measurement: --  Triglycerides, Serum: 149

## 2024-02-13 NOTE — BH INPATIENT PSYCHIATRY PROGRESS NOTE - NSBHCHARTREVIEWVS_PSY_A_CORE FT
Vital Signs Last 24 Hrs  T(C): 37.1 (02-13-24 @ 08:38), Max: 37.1 (02-13-24 @ 08:38)  T(F): 98.7 (02-13-24 @ 08:38), Max: 98.7 (02-13-24 @ 08:38)  HR: --  BP: --  BP(mean): --  RR: --  SpO2: --    Orthostatic VS  02-13-24 @ 08:38  Lying BP: --/-- HR: --  Sitting BP: 105/74 HR: 97  Standing BP: 103/72 HR: 96  Site: --  Mode: --  Orthostatic VS  02-12-24 @ 08:12  Lying BP: --/-- HR: --  Sitting BP: 101/60 HR: 85  Standing BP: 98/62 HR: 80  Site: --  Mode: --

## 2024-02-13 NOTE — BH INPATIENT PSYCHIATRY PROGRESS NOTE - NSBHASSESSSUMMFT_PSY_ALL_CORE
27yo M, single, unemployed, noncaregiver, domiciled with step-father/sister/uncle, with PPHx of documented Schizoaffective, bipolar type, at least 6 past hospitalizations last at [facility] 2 days ago, no known hx of suicidality or violence, no substance use, no relevant PMHx who presents to the ED after recent discharge from [facility] for worsening anxiety and SI.    on assessment, patient is overly bright, calm, keeping to himself in room, minimally verbal on interaction and superficially cooperative, suspect delusions,  will reorder lithium and VPA levels.     No 1:1 needed as the patient is calm, pleasant, happy to be getting help.  No active SI on the unit.  decrease Zyprexa to 7.5 mg hs, plan to stop  Restarted Lithium 900mg hs (was on 600mg bid - but level high on admission)  Restarted VPA as ER 1500mg hs (was on 1000mg bid)  c/w  Risperdal 3 mg hs with plan to titrate and plan for Invega OLIVIA  Obtain collateral from outpatient provider - message left  Encourage unit participation

## 2024-02-14 LAB
ALBUMIN SERPL ELPH-MCNC: 4.1 G/DL — SIGNIFICANT CHANGE UP (ref 3.3–5)
ALP SERPL-CCNC: 72 U/L — SIGNIFICANT CHANGE UP (ref 40–120)
ALT FLD-CCNC: 21 U/L — SIGNIFICANT CHANGE UP (ref 4–41)
ANION GAP SERPL CALC-SCNC: 11 MMOL/L — SIGNIFICANT CHANGE UP (ref 7–14)
AST SERPL-CCNC: 19 U/L — SIGNIFICANT CHANGE UP (ref 4–40)
BILIRUB SERPL-MCNC: 0.3 MG/DL — SIGNIFICANT CHANGE UP (ref 0.2–1.2)
BUN SERPL-MCNC: 12 MG/DL — SIGNIFICANT CHANGE UP (ref 7–23)
CALCIUM SERPL-MCNC: 9.4 MG/DL — SIGNIFICANT CHANGE UP (ref 8.4–10.5)
CHLORIDE SERPL-SCNC: 103 MMOL/L — SIGNIFICANT CHANGE UP (ref 98–107)
CO2 SERPL-SCNC: 25 MMOL/L — SIGNIFICANT CHANGE UP (ref 22–31)
CREAT SERPL-MCNC: 0.98 MG/DL — SIGNIFICANT CHANGE UP (ref 0.5–1.3)
EGFR: 109 ML/MIN/1.73M2 — SIGNIFICANT CHANGE UP
GLUCOSE SERPL-MCNC: 94 MG/DL — SIGNIFICANT CHANGE UP (ref 70–99)
LITHIUM SERPL-MCNC: 0.8 MMOL/L — SIGNIFICANT CHANGE UP (ref 0.6–1.2)
POTASSIUM SERPL-MCNC: 4 MMOL/L — SIGNIFICANT CHANGE UP (ref 3.5–5.3)
POTASSIUM SERPL-SCNC: 4 MMOL/L — SIGNIFICANT CHANGE UP (ref 3.5–5.3)
PROT SERPL-MCNC: 7.5 G/DL — SIGNIFICANT CHANGE UP (ref 6–8.3)
SODIUM SERPL-SCNC: 139 MMOL/L — SIGNIFICANT CHANGE UP (ref 135–145)
VALPROATE SERPL-MCNC: 69.8 UG/ML — SIGNIFICANT CHANGE UP (ref 50–100)

## 2024-02-14 PROCEDURE — 99232 SBSQ HOSP IP/OBS MODERATE 35: CPT

## 2024-02-14 RX ADMIN — RISPERIDONE 3 MILLIGRAM(S): 4 TABLET ORAL at 20:27

## 2024-02-14 RX ADMIN — DIVALPROEX SODIUM 1500 MILLIGRAM(S): 500 TABLET, DELAYED RELEASE ORAL at 20:27

## 2024-02-14 RX ADMIN — LITHIUM CARBONATE 900 MILLIGRAM(S): 300 TABLET, EXTENDED RELEASE ORAL at 20:27

## 2024-02-14 NOTE — BH TREATMENT PLAN - NSTXDEPRESINTERRN_PSY_ALL_CORE
RN will encourage pt to seek for staff and verbalize thoughts and feelings when necessary
RN will encourage pt to seek for staff and verbalize thoughts and feelings when necessary

## 2024-02-14 NOTE — BH INPATIENT PSYCHIATRY PROGRESS NOTE - NSBHMETABOLIC_PSY_ALL_CORE_FT
BMI: BMI (kg/m2): 33.7 (02-03-24 @ 23:55)  HbA1c: A1C with Estimated Average Glucose Result: 5.6 % (02-05-24 @ 09:00)    Glucose:   BP: --Vital Signs Last 24 Hrs  T(C): 36.3 (02-14-24 @ 08:55), Max: 36.3 (02-14-24 @ 08:55)  T(F): 97.4 (02-14-24 @ 08:55), Max: 97.4 (02-14-24 @ 08:55)  HR: 90 (02-14-24 @ 08:55) (90 - 90)  BP: --  BP(mean): --  RR: --  SpO2: --    Orthostatic VS  02-14-24 @ 08:55  Lying BP: --/-- HR: --  Sitting BP: 108/61 HR: 90  Standing BP: 103/62 HR: 109  Site: upper left arm  Mode: electronic  Orthostatic VS  02-13-24 @ 08:38  Lying BP: --/-- HR: --  Sitting BP: 105/74 HR: 97  Standing BP: 103/72 HR: 96  Site: --  Mode: --    Lipid Panel: Date/Time: 02-05-24 @ 09:00  Cholesterol, Serum: 135  LDL Cholesterol Calculated: 57  HDL Cholesterol, Serum: 48  Total Cholesterol/HDL Ration Measurement: --  Triglycerides, Serum: 149

## 2024-02-14 NOTE — BH INPATIENT PSYCHIATRY PROGRESS NOTE - NSBHASSESSSUMMFT_PSY_ALL_CORE
29yo M, single, unemployed, noncaregiver, domiciled with step-father/sister/uncle, with PPHx of documented Schizoaffective, bipolar type, at least 6 past hospitalizations last at [facility] 2 days ago, no known hx of suicidality or violence, no substance use, no relevant PMHx who presents to the ED after recent discharge from [facility] ED for worsening anxiety and SI.    on assessment, patient is superficially cooperative, calm, keeping to himself in room, minimally verbal on interaction.       No 1:1 needed as the patient is calm, pleasant, happy to be getting help.  No active SI on the unit.  stop  Zyprexa 7.5 mg hs  Restarted Lithium 900mg hs (was on 600mg bid - but level high on admission), lithium level at 0.8 on 2/14  Restarted VPA as ER 1500mg hs (was on 1000mg bid), vpa level at 69 on 2/14  c/w  Risperdal 3 mg hs with plan to titrate and plan for Invega OLIVIA  Obtain collateral from outpatient provider - message left  Encourage unit participation

## 2024-02-14 NOTE — BH INPATIENT PSYCHIATRY PROGRESS NOTE - NSBHCHARTREVIEWVS_PSY_A_CORE FT
Vital Signs Last 24 Hrs  T(C): 36.3 (02-14-24 @ 08:55), Max: 36.3 (02-14-24 @ 08:55)  T(F): 97.4 (02-14-24 @ 08:55), Max: 97.4 (02-14-24 @ 08:55)  HR: 90 (02-14-24 @ 08:55) (90 - 90)  BP: --  BP(mean): --  RR: --  SpO2: --    Orthostatic VS  02-14-24 @ 08:55  Lying BP: --/-- HR: --  Sitting BP: 108/61 HR: 90  Standing BP: 103/62 HR: 109  Site: upper left arm  Mode: electronic  Orthostatic VS  02-13-24 @ 08:38  Lying BP: --/-- HR: --  Sitting BP: 105/74 HR: 97  Standing BP: 103/72 HR: 96  Site: --  Mode: --

## 2024-02-14 NOTE — BH TREATMENT PLAN - NSDCCRITERIA_PSY_ALL_CORE
symptom management, safety planning, care coordination
symptom management, safety planning, care coordination

## 2024-02-14 NOTE — BH TREATMENT PLAN - NSCMSPTSTRENGTHS_PSY_ALL_CORE
Compliance to treatment/Physically healthy/Supportive family
Compliance to treatment/Physically healthy/Supportive family

## 2024-02-14 NOTE — BH TREATMENT PLAN - NSTXMANICINTERRN_PSY_ALL_CORE
Encourage pt to verbalize negative thought and feelings
Encourage pt to verbalize negative thought and feelings

## 2024-02-14 NOTE — BH TREATMENT PLAN - NSTXPLANTHERAPYSESSIONSFT_PSY_ALL_CORE
02-11-24  Type of therapy: Coping skills, Creative arts therapy, Leisure development  Type of session: Individual  Level of patient participation: Engaged  Duration of participation: 15 minutes  Therapy conducted by: Psych rehab  Therapy Summary: Writer met with patient for an individual session in order to review progress towards psychiatric rehabilitation goals. Patient was receptive to meeting with writer and engaged in session. Patient has demonstrated fair improvements in psychiatric symptoms over the past seven days. Patient reported improved mood and stated that he has been feeling calmer. Patient is adherent to medication which he attributes to some symptom improvement. Patient endorsed fair sleep and appetite. Patient expressed looking forward to watching the Superbowl later tonight. Due to the COVID-19 pandemic, unit structure and activities are re-evaluated on a consistent basis in effort to maintain the safety of patients and staff. Patient attends some psychiatric rehabilitation groups and engages appropriately during sessions. Patient is visible on the unit and maintains fair behavioral control. Patient is social with peers and able to communicate needs to staff.

## 2024-02-14 NOTE — BH TREATMENT PLAN - NSTXCOPEINTERRN_PSY_ALL_CORE
Encourage pt to use therapetuic coping skill when needed
Encourage pt to use therapetuic coping skill when needed

## 2024-02-14 NOTE — BH TREATMENT PLAN - NSTXANXINTERPR_PSY_ALL_CORE
Over the next 7 days, Psychiatric Rehabilitation staff will utilize group and individual psychotherapy, and psychoeducation to assist the patient in reaching their treatment goal.
Patient has demonstrated some progress towards psychiatric rehabilitation goal of identifying and utilizing 3 coping skills to manage anxiety. Patient stated that attending leisure groups has been helpful and in particular he enjoys getting to listen to music. Psychiatric rehabilitation recommends patient continue to attend groups, engage in individual sessions, and participate in activities in the milieu to continue exploring coping skills to manage symptoms.

## 2024-02-14 NOTE — BH TREATMENT PLAN - NSTXDCOPLKINTERSW_PSY_ALL_CORE
Support and psychoed to be provided and all elements of the discharge plans to be arranged when appropriate.
Support and psychoed being provided and all elements of the discharge plans to be arranged when appropriate.

## 2024-02-14 NOTE — BH TREATMENT PLAN - NSTXMANICGOAL_PSY_ALL_CORE
Be able to identify the early signs of sophie (e.g. sleep and mood changes) and to employ coping strategies to minimize acting out
Be able to identify the early signs of sophie (e.g. sleep and mood changes) and to employ coping strategies to minimize acting out

## 2024-02-14 NOTE — BH INPATIENT PSYCHIATRY PROGRESS NOTE - NSBHFUPINTERVALHXFT_PSY_A_CORE
f/up SAD, care discussed w/ tx team, VSS. Labs reviewed: lithium level at 0.8 and VPA level at 69.8, no overnight issues. Patient continues to be isolative to room in the AM, reports getting out into the milieu during the evening hours, is superficially engaged in interaction, focused on discharge, tolerating medication changes. Reported that he was admitted for worsening depression and suicidality and no longer feels that way.  no td, eps or tremors observed/ reported. denied dizziness.

## 2024-02-15 PROCEDURE — 99231 SBSQ HOSP IP/OBS SF/LOW 25: CPT

## 2024-02-15 RX ADMIN — DIVALPROEX SODIUM 1500 MILLIGRAM(S): 500 TABLET, DELAYED RELEASE ORAL at 20:47

## 2024-02-15 RX ADMIN — RISPERIDONE 3 MILLIGRAM(S): 4 TABLET ORAL at 20:47

## 2024-02-15 RX ADMIN — LITHIUM CARBONATE 900 MILLIGRAM(S): 300 TABLET, EXTENDED RELEASE ORAL at 20:47

## 2024-02-15 NOTE — BH INPATIENT PSYCHIATRY PROGRESS NOTE - NSBHCHARTREVIEWVS_PSY_A_CORE FT
Vital Signs Last 24 Hrs  T(C): 36.2 (02-15-24 @ 06:02), Max: 36.2 (02-15-24 @ 06:02)  T(F): 97.2 (02-15-24 @ 06:02), Max: 97.2 (02-15-24 @ 06:02)  HR: --  BP: --  BP(mean): --  RR: --  SpO2: --    Orthostatic VS  02-15-24 @ 06:02  Lying BP: --/-- HR: --  Sitting BP: 112/70 HR: 94  Standing BP: 103/65 HR: 98  Site: --  Mode: --  Orthostatic VS  02-14-24 @ 08:55  Lying BP: --/-- HR: --  Sitting BP: 108/61 HR: 90  Standing BP: 103/62 HR: 109  Site: upper left arm  Mode: electronic

## 2024-02-15 NOTE — BH INPATIENT PSYCHIATRY PROGRESS NOTE - NSBHMETABOLIC_PSY_ALL_CORE_FT
BMI: BMI (kg/m2): 33.7 (02-03-24 @ 23:55)  HbA1c: A1C with Estimated Average Glucose Result: 5.6 % (02-05-24 @ 09:00)    Glucose:   BP: --Vital Signs Last 24 Hrs  T(C): 36.2 (02-15-24 @ 06:02), Max: 36.2 (02-15-24 @ 06:02)  T(F): 97.2 (02-15-24 @ 06:02), Max: 97.2 (02-15-24 @ 06:02)  HR: --  BP: --  BP(mean): --  RR: --  SpO2: --    Orthostatic VS  02-15-24 @ 06:02  Lying BP: --/-- HR: --  Sitting BP: 112/70 HR: 94  Standing BP: 103/65 HR: 98  Site: --  Mode: --  Orthostatic VS  02-14-24 @ 08:55  Lying BP: --/-- HR: --  Sitting BP: 108/61 HR: 90  Standing BP: 103/62 HR: 109  Site: upper left arm  Mode: electronic    Lipid Panel: Date/Time: 02-05-24 @ 09:00  Cholesterol, Serum: 135  LDL Cholesterol Calculated: 57  HDL Cholesterol, Serum: 48  Total Cholesterol/HDL Ration Measurement: --  Triglycerides, Serum: 149

## 2024-02-15 NOTE — BH INPATIENT PSYCHIATRY PROGRESS NOTE - NSBHFUPINTERVALHXFT_PSY_A_CORE
chart reviewed including pertinent labs. Case discussed with nursing staff. no overt behavioral issues. pt reports sleeping well, feels his mood is improved, and he currently denies feeling paranoid, denies racing thoughts, denies SI and HI, denies AVH. he states he is interested in receiving OLIVIA and that he wants to also pursue PHP as an outpatient, [facility] primary team is exploring this for him. he hopes to be discharged soon. no new complaints at this time and he states he plans to attend groups today

## 2024-02-15 NOTE — BH INPATIENT PSYCHIATRY PROGRESS NOTE - NSBHASSESSSUMMFT_PSY_ALL_CORE
29yo M, single, unemployed, noncaregiver, domiciled with step-father/sister/uncle, with PPHx of documented Schizoaffective, bipolar type, at least 6 past hospitalizations last at [facility] 2 days ago, no known hx of suicidality or violence, no substance use, no relevant PMHx who presents to the ED after recent discharge from [facility] ED for worsening anxiety and SI.    on assessment, subjective improvements in mood. pt discharge focused wants to be connected to PHP. plan per primary as below       No 1:1 needed as the patient is calm, pleasant, happy to be getting help.  No active SI on the unit.  stop  Zyprexa 7.5 mg hs  Restarted Lithium 900mg hs (was on 600mg bid - but level high on admission), lithium level at 0.8 on 2/14  Restarted VPA as ER 1500mg hs (was on 1000mg bid), vpa level at 69 on 2/14  c/w  Risperdal 3 mg hs with plan to titrate and plan for Invega OLIVIA  Obtain collateral from outpatient provider - message left  Encourage unit participation

## 2024-02-15 NOTE — BH INPATIENT PSYCHIATRY PROGRESS NOTE - NSBHATTESTBILLING_PSY_A_CORE
59579-Czomqmpxvd OBS or IP - moderate complexity OR 35-49 mins 13639-Jdydhejbhr OBS or IP - low complexity OR 25-34 mins

## 2024-02-16 PROCEDURE — 99232 SBSQ HOSP IP/OBS MODERATE 35: CPT

## 2024-02-16 RX ORDER — PALIPERIDONE 1.5 MG/1
234 TABLET, EXTENDED RELEASE ORAL ONCE
Refills: 0 | Status: COMPLETED | OUTPATIENT
Start: 2024-02-16 | End: 2024-02-16

## 2024-02-16 RX ADMIN — PALIPERIDONE 234 MILLIGRAM(S): 1.5 TABLET, EXTENDED RELEASE ORAL at 15:05

## 2024-02-16 RX ADMIN — DIVALPROEX SODIUM 1500 MILLIGRAM(S): 500 TABLET, DELAYED RELEASE ORAL at 20:20

## 2024-02-16 RX ADMIN — RISPERIDONE 3 MILLIGRAM(S): 4 TABLET ORAL at 20:20

## 2024-02-16 RX ADMIN — LITHIUM CARBONATE 900 MILLIGRAM(S): 300 TABLET, EXTENDED RELEASE ORAL at 20:20

## 2024-02-16 NOTE — BH INPATIENT PSYCHIATRY PROGRESS NOTE - NSBHASSESSSUMMFT_PSY_ALL_CORE
27yo M, single, unemployed, noncaregiver, domiciled with step-father/sister/uncle, with PPHx of documented Schizoaffective, bipolar type, at least 6 past hospitalizations last at [facility] 2 days ago, no known hx of suicidality or violence, no substance use, no relevant PMHx who presents to the ED after recent discharge from [facility] ED for worsening anxiety and SI.    on assessment, patient reported improved mood, no SI/I/P, reports mood to be euthymic, in agreement w/ OLIVIA.     No 1:1 needed as the patient is calm, pleasant, happy to be getting help.  No active SI on the unit.  stop  Zyprexa 7.5 mg hs  Restarted Lithium 900mg hs (was on 600mg bid - but level high on admission), lithium level at 0.8 on 2/14  Restarted VPA as ER 1500mg hs (was on 1000mg bid), vpa level at 69 on 2/14  c/w  Risperdal 3 mg hs, INvega sustenna 234mg IM ordered for 2/16  Obtain collateral from outpatient provider - message left  Encourage unit participation

## 2024-02-16 NOTE — BH INPATIENT PSYCHIATRY PROGRESS NOTE - NSBHMETABOLIC_PSY_ALL_CORE_FT
BMI: BMI (kg/m2): 33.7 (02-03-24 @ 23:55)  HbA1c: A1C with Estimated Average Glucose Result: 5.6 % (02-05-24 @ 09:00)    Glucose:   BP: --Vital Signs Last 24 Hrs  T(C): 35.8 (02-16-24 @ 08:01), Max: 35.8 (02-16-24 @ 08:01)  T(F): 96.4 (02-16-24 @ 08:01), Max: 96.4 (02-16-24 @ 08:01)  HR: --  BP: --  BP(mean): --  RR: --  SpO2: --    Orthostatic VS  02-16-24 @ 08:01  Lying BP: --/-- HR: --  Sitting BP: 98/64 HR: 89  Standing BP: 103/60 HR: 73  Site: --  Mode: --  Orthostatic VS  02-15-24 @ 06:02  Lying BP: --/-- HR: --  Sitting BP: 112/70 HR: 94  Standing BP: 103/65 HR: 98  Site: --  Mode: --    Lipid Panel: Date/Time: 02-05-24 @ 09:00  Cholesterol, Serum: 135  LDL Cholesterol Calculated: 57  HDL Cholesterol, Serum: 48  Total Cholesterol/HDL Ration Measurement: --  Triglycerides, Serum: 149

## 2024-02-16 NOTE — BH INPATIENT PSYCHIATRY PROGRESS NOTE - NSBHFUPINTERVALHXFT_PSY_A_CORE
f/up SAD, care discussed w/ tx team, VSS. no overnight issues. agreeing to OLIVIA--will order invega sustenna 234mg IM for this afternoon.  Patient continues to be isolative to room in the AM, reports getting out into the milieu during the evening hours, reported feeling better, denied racing thoughts, paranoia or suicidal ideation.  tolerating medication changes.   no td, eps or tremors observed/ reported. denied dizziness.

## 2024-02-16 NOTE — BH INPATIENT PSYCHIATRY PROGRESS NOTE - NSBHCHARTREVIEWVS_PSY_A_CORE FT
Vital Signs Last 24 Hrs  T(C): 35.8 (02-16-24 @ 08:01), Max: 35.8 (02-16-24 @ 08:01)  T(F): 96.4 (02-16-24 @ 08:01), Max: 96.4 (02-16-24 @ 08:01)  HR: --  BP: --  BP(mean): --  RR: --  SpO2: --    Orthostatic VS  02-16-24 @ 08:01  Lying BP: --/-- HR: --  Sitting BP: 98/64 HR: 89  Standing BP: 103/60 HR: 73  Site: --  Mode: --  Orthostatic VS  02-15-24 @ 06:02  Lying BP: --/-- HR: --  Sitting BP: 112/70 HR: 94  Standing BP: 103/65 HR: 98  Site: --  Mode: --

## 2024-02-17 RX ADMIN — RISPERIDONE 3 MILLIGRAM(S): 4 TABLET ORAL at 20:35

## 2024-02-17 RX ADMIN — DIVALPROEX SODIUM 1500 MILLIGRAM(S): 500 TABLET, DELAYED RELEASE ORAL at 20:34

## 2024-02-17 RX ADMIN — LITHIUM CARBONATE 900 MILLIGRAM(S): 300 TABLET, EXTENDED RELEASE ORAL at 20:34

## 2024-02-18 RX ADMIN — RISPERIDONE 3 MILLIGRAM(S): 4 TABLET ORAL at 20:56

## 2024-02-18 RX ADMIN — DIVALPROEX SODIUM 1500 MILLIGRAM(S): 500 TABLET, DELAYED RELEASE ORAL at 20:56

## 2024-02-18 RX ADMIN — LITHIUM CARBONATE 900 MILLIGRAM(S): 300 TABLET, EXTENDED RELEASE ORAL at 20:56

## 2024-02-19 RX ADMIN — DIVALPROEX SODIUM 1500 MILLIGRAM(S): 500 TABLET, DELAYED RELEASE ORAL at 20:25

## 2024-02-19 RX ADMIN — RISPERIDONE 3 MILLIGRAM(S): 4 TABLET ORAL at 20:25

## 2024-02-19 RX ADMIN — LITHIUM CARBONATE 900 MILLIGRAM(S): 300 TABLET, EXTENDED RELEASE ORAL at 20:24

## 2024-02-20 RX ADMIN — DIVALPROEX SODIUM 1500 MILLIGRAM(S): 500 TABLET, DELAYED RELEASE ORAL at 20:18

## 2024-02-20 RX ADMIN — RISPERIDONE 3 MILLIGRAM(S): 4 TABLET ORAL at 20:18

## 2024-02-20 RX ADMIN — LITHIUM CARBONATE 900 MILLIGRAM(S): 300 TABLET, EXTENDED RELEASE ORAL at 20:18

## 2024-02-20 NOTE — BH INPATIENT PSYCHIATRY PROGRESS NOTE - NSBHASSESSSUMMFT_PSY_ALL_CORE
27yo M, single, unemployed, noncaregiver, domiciled with step-father/sister/uncle, with PPHx of documented Schizoaffective, bipolar type, at least 6 past hospitalizations last at [facility] 2 days ago, no known hx of suicidality or violence, no substance use, no relevant PMHx who presents to the ED after recent discharge from [facility] ED for worsening anxiety and SI.    on assessment, patient reported improved mood, no SI/I/P, reports mood to be euthymic, in agreement w/ OLIVIA.     No 1:1 needed as the patient is calm, pleasant, happy to be getting help.  No active SI on the unit.  stop  Zyprexa 7.5 mg hs  Restarted Lithium 900mg hs (was on 600mg bid - but level high on admission), lithium level at 0.8 on 2/14  Restarted VPA as ER 1500mg hs (was on 1000mg bid), vpa level at 69 on 2/14  c/w  Risperdal 3 mg hs, INvega sustenna 234mg IM ordered for 2/16  Obtain collateral from outpatient provider - message left  Encourage unit participation 27yo M, single, unemployed, noncaregiver, domiciled with step-father/sister/uncle, with PPHx of documented Schizoaffective, bipolar type, at least 6 past hospitalizations last at [facility] 2 days ago, no known hx of suicidality or violence, no substance use, no relevant PMHx who presents to the ED after recent discharge from [facility] for worsening anxiety and SI.    on assessment, patient reported improved mood, no SI/I/P, reports mood to be euthymic, in agreement w/ OLIVIA with 1st dose OLIVIA received 2/16.      No 1:1 needed as the patient is calm, pleasant, happy to be getting help.  No active SI on the unit.  stop  Zyprexa 7.5 mg hs  Restarted Lithium 900mg hs (was on 600mg bid - but level high on admission), lithium level at 0.8 on 2/14  Restarted VPA as ER 1500mg hs (was on 1000mg bid), vpa level at 69 on 2/14  c/w  Risperdal 3 mg hs, Invega sustenna 234mg IM given on 2/16  Obtain collateral from outpatient provider - message left  Encourage unit participation  SW to pursue dc planning

## 2024-02-20 NOTE — BH INPATIENT PSYCHIATRY PROGRESS NOTE - NSBHCHARTREVIEWVS_PSY_A_CORE FT
Vital Signs Last 24 Hrs  T(C): --  T(F): --  HR: --  BP: --  BP(mean): --  RR: --  SpO2: --    Orthostatic VS  02-20-24 @ 08:12  Lying BP: --/-- HR: --  Sitting BP: 93/67 HR: 85  Standing BP: 92/60 HR: 106  Site: --  Mode: --

## 2024-02-20 NOTE — BH INPATIENT PSYCHIATRY PROGRESS NOTE - NSBHMETABOLIC_PSY_ALL_CORE_FT
BMI: BMI (kg/m2): 33.7 (02-03-24 @ 23:55)  HbA1c: A1C with Estimated Average Glucose Result: 5.6 % (02-05-24 @ 09:00)    Glucose:   BP: --Vital Signs Last 24 Hrs  T(C): --  T(F): --  HR: --  BP: --  BP(mean): --  RR: --  SpO2: --    Orthostatic VS  02-20-24 @ 08:12  Lying BP: --/-- HR: --  Sitting BP: 93/67 HR: 85  Standing BP: 92/60 HR: 106  Site: --  Mode: --    Lipid Panel: Date/Time: 02-05-24 @ 09:00  Cholesterol, Serum: 135  LDL Cholesterol Calculated: 57  HDL Cholesterol, Serum: 48  Total Cholesterol/HDL Ration Measurement: --  Triglycerides, Serum: 149

## 2024-02-20 NOTE — BH INPATIENT PSYCHIATRY PROGRESS NOTE - NSBHATTESTBILLING_PSY_A_CORE
50977-Zuxniueaau OBS or IP - moderate complexity OR 35-49 mins 53276-Wgbtzhfaxl OBS or IP - low complexity OR 25-34 mins

## 2024-02-20 NOTE — BH INPATIENT PSYCHIATRY PROGRESS NOTE - NSBHFUPINTERVALHXFT_PSY_A_CORE
f/up SAD, care discussed w/ tx team, VSS. no overnight issues. agreeing to OLIVIA--will order invega sustenna 234mg IM for this afternoon.  Patient continues to be isolative to room in the AM, reports getting out into the milieu during the evening hours, reported feeling better, denied racing thoughts, paranoia or suicidal ideation.  tolerating medication changes.   no td, eps or tremors observed/ reported. denied dizziness.  Seen and evaluated for f/up SAD, care discussed w/ tx team, VSS. no overnight issues. received and tolerated OLIVIA--invega sustenna 234mg IM given on Friday 2/16.  Patient continues to be isolative to room in the AM, reports getting out into the milieu during the evening hours, reported feeling better and now discharged focused.  Pt denied racing thoughts, paranoia or suicidal ideation.  Pt is tolerating medication changes with mood benefit.  No side effects noted, no td, eps or tremors observed/ reported. denied dizziness. No agitation, no somatic complaints.

## 2024-02-21 PROCEDURE — 99232 SBSQ HOSP IP/OBS MODERATE 35: CPT

## 2024-02-21 RX ORDER — PALIPERIDONE 1.5 MG/1
156 TABLET, EXTENDED RELEASE ORAL ONCE
Refills: 0 | Status: COMPLETED | OUTPATIENT
Start: 2024-02-22 | End: 2024-02-22

## 2024-02-21 RX ADMIN — DIVALPROEX SODIUM 1500 MILLIGRAM(S): 500 TABLET, DELAYED RELEASE ORAL at 21:05

## 2024-02-21 RX ADMIN — RISPERIDONE 3 MILLIGRAM(S): 4 TABLET ORAL at 21:05

## 2024-02-21 RX ADMIN — LITHIUM CARBONATE 900 MILLIGRAM(S): 300 TABLET, EXTENDED RELEASE ORAL at 21:05

## 2024-02-21 NOTE — BH INPATIENT PSYCHIATRY PROGRESS NOTE - NSBHASSESSSUMMFT_PSY_ALL_CORE
27yo M, single, unemployed, noncaregiver, domiciled with step-father/sister/uncle, with PPHx of documented Schizoaffective, bipolar type, at least 6 past hospitalizations last at [facility] 2 days ago, no known hx of suicidality or violence, no substance use, no relevant PMHx who presents to the ED after recent discharge from [facility] for worsening anxiety and SI.    on assessment, patient reported improved mood, no SI/I/P, reports mood to be euthymic, in agreement w/ OLIVIA, labs in AM and dc planning, including outpatient follow up.     No 1:1 needed as the patient is calm, pleasant, happy to be getting help.  No active SI on the unit.  stop  Zyprexa 7.5 mg hs  Restarted Lithium 900mg hs (was on 600mg bid - but level high on admission), lithium level at 0.8 on 2/14  Restarted VPA as ER 1500mg hs (was on 1000mg bid), vpa level at 69 on 2/14  c/w  Risperdal 3 mg hs, Invega sustenna 234mg IM given on 2/16  CBC, CMP, VPA level, Lithium level, EKG in AM on 2/22  Obtain collateral from outpatient provider  Encourage unit participation  Disposition: SW pursuing discharge planning, possible for Friday 2/23 27yo M, single, unemployed, noncaregiver, domiciled with step-father/sister/uncle, with PPHx of documented Schizoaffective, bipolar type, at [facility], no known hx of suicidality or violence, no substance use, no relevant PMHx who presents to the ED after recent discharge from [facility] for worsening anxiety and SI.    on assessment, patient reported improved mood, no SI/I/P, reports mood to be euthymic, in agreement w/ OLIVIA, received loading dose #1 on 2/16, 2nd loading dose of Sustenna ordered for tomorrow 2/22, labs in AM and dc planning, including outpatient follow up.     No 1:1 needed as the patient is calm, pleasant, happy to be getting help.  No active SI on the unit.  stop  Zyprexa 7.5 mg hs  Restarted Lithium 900mg hs (was on 600mg bid - but level high on admission), lithium level at 0.8 on 2/14  Restarted VPA as ER 1500mg hs (was on 1000mg bid), vpa level at 69 on 2/14  c/w  Risperdal 3 mg hs, Invega sustenna 234mg IM given on 2/16, 2nd loading dose given on 2/22 156mg   CBC, CMP, VPA level, Lithium level, EKG in AM on 2/22  Obtain collateral from outpatient provider  Encourage unit participation  Disposition: [facility] pursuing discharge planning, possible for Friday 2/23

## 2024-02-21 NOTE — BH DISCHARGE NOTE NURSING/SOCIAL WORK/PSYCH REHAB - NSDCPRGOAL_PSY_ALL_CORE
Writer met pt for safety planning and discussing progress into this hospitalization. Pt was able to complete safety plan with writer’s encouragement. Pt is able to identify warning signs, coping skills and people that he can reach out for support after discharge. Pt has made progress into his psych rehab goal of identifying coping strategies for better sxs management. Pt is able to identify playing games, going outside and taking a break as his coping strategies. Pt reported improvements in mood, thought process and sleep. Pt is future oriented and feels ready to be discharged and to continue working with his outpatient treatment for better sxs management. Pt denies SI/HI/AH/VH. In this hospitalization, pt has demonstrated medication compliance. Pt has been in good behavioral control on the unit. Pt is cooperative and calm with staff. Pt is able to verbalize his feelings, thoughts and reach out to staff for support. Pt is visible and engages in milieu activities. Pt attended approximately 30% of psych rehab groups. Pt is able to appropriately participate in group activities with encouragement. Pt is encouraged to continue working on medication compliance and coping skills, attend outpatient treatment and verbalize feelings/concerns.

## 2024-02-21 NOTE — BH DISCHARGE NOTE NURSING/SOCIAL WORK/PSYCH REHAB - NSDCSUICIDEPREP_PSY_ALL_CORE
- patient with lower extremity edema Improved   - Lasix 40 mg IV Q 12hrs - patient with lower extremity edema Improved   - Lasix 40 mg IV Q 12hrs - patient with lower extremity edema Improved   - Lasix 40 mg IV Q 12hrs - patient with lower extremity edema Improved   - Lasix 40 mg IV Q 12hrs - patient with lower extremity edema Improved   - Lasix 40 mg IV Q 12hrs - patient with lower extremity edema Improved   - Lasix 40 mg IV Q 12hrs No

## 2024-02-21 NOTE — BH DISCHARGE NOTE NURSING/SOCIAL WORK/PSYCH REHAB - PATIENT PORTAL LINK FT
You can access the FollowMyHealth Patient Portal offered by [facility] by registering at the following website: [email or web address]. By joining [facility]’s FollowMyHealth portal, you will also be able to view your health information using other applications (apps) compatible with our system.

## 2024-02-21 NOTE — BH DISCHARGE NOTE NURSING/SOCIAL WORK/PSYCH REHAB - DISCHARGE INSTRUCTIONS AFTERCARE APPOINTMENTS
In order to check the location, date, or time of your aftercare appointment, please refer to your Discharge Instructions Document given to you upon leaving the hospital.  If you have lost the instructions please call [phone]

## 2024-02-21 NOTE — BH DISCHARGE NOTE NURSING/SOCIAL WORK/PSYCH REHAB - NSCDUDCCRISIS_PSY_A_CORE
Pending sale to [facility]  1 [phone]) Pending sale to [facility] [phone])  Text "WELL" to [phone]  Website: [email or web address] [phone] 621-HOPE (1031) Website: [email or web address].org/.  [facility]  1 (031) LIFENET ([phone])/.  [facility]  [address] [place] [address] [phone]   Hours:  Monday through Friday from 9 AM to 3 PM/[facility]

## 2024-02-21 NOTE — BH INPATIENT PSYCHIATRY PROGRESS NOTE - NSBHFUPINTERVALHXFT_PSY_A_CORE
Pt is seen and evaluated for f/up for SAD, chart is reviewed and care discussed w/ tx team, VSS. no overnight issues. agreed to OLIVIA--received invega sustenna 234mg IM on 2/16.  Patient is more visible and less isolative.  Pt is now discharge focused, reported feeling better, denied racing thoughts, paranoia or suicidal ideation.  Tolerating medications with no side effects, no td, eps or tremors observed/ reported. denied dizziness.  No agitation and in good behavioral control.  In no apparent distress, no somatic complaints.  Discharge planning being pursued and d/w patient and SW.  Labwork ordered for tomorrow AM and will seek coordination with family and outpatient psychiatric team.  Pt is seen and evaluated for f/up for SAD, chart is reviewed and care discussed w/ tx team, VSS. no overnight issues. agreed to OLIVIA--received invega sustenna 234mg IM on 2/16.  Patient is more visible and less isolative.  Pt is now discharge focused, reported feeling better, denied racing thoughts, paranoia or suicidal ideation.  Tolerating medications with no side effects, no td, eps or tremors observed/ reported. denied dizziness.  No agitation and in good behavioral control.  In no apparent distress, no somatic complaints.  Discharge planning being pursued and d/w patient and SW.  Labwork ordered for tomorrow AM and will seek coordination with family and outpatient psychiatric team.  Scheduled for 2nd loading dose of OLIVIA Invega Sustenna 156mg tomorrow on 2/22.

## 2024-02-22 LAB
ALBUMIN SERPL ELPH-MCNC: 4.3 G/DL — SIGNIFICANT CHANGE UP (ref 3.3–5)
ALP SERPL-CCNC: 86 U/L — SIGNIFICANT CHANGE UP (ref 40–120)
ALT FLD-CCNC: 20 U/L — SIGNIFICANT CHANGE UP (ref 4–41)
ANION GAP SERPL CALC-SCNC: 13 MMOL/L — SIGNIFICANT CHANGE UP (ref 7–14)
AST SERPL-CCNC: 19 U/L — SIGNIFICANT CHANGE UP (ref 4–40)
BASOPHILS # BLD AUTO: 0.05 K/UL — SIGNIFICANT CHANGE UP (ref 0–0.2)
BASOPHILS NFR BLD AUTO: 0.5 % — SIGNIFICANT CHANGE UP (ref 0–2)
BILIRUB SERPL-MCNC: 0.3 MG/DL — SIGNIFICANT CHANGE UP (ref 0.2–1.2)
BUN SERPL-MCNC: 14 MG/DL — SIGNIFICANT CHANGE UP (ref 7–23)
CALCIUM SERPL-MCNC: 9.8 MG/DL — SIGNIFICANT CHANGE UP (ref 8.4–10.5)
CHLORIDE SERPL-SCNC: 101 MMOL/L — SIGNIFICANT CHANGE UP (ref 98–107)
CO2 SERPL-SCNC: 24 MMOL/L — SIGNIFICANT CHANGE UP (ref 22–31)
CREAT SERPL-MCNC: 0.84 MG/DL — SIGNIFICANT CHANGE UP (ref 0.5–1.3)
EGFR: 123 ML/MIN/1.73M2 — SIGNIFICANT CHANGE UP
EOSINOPHIL # BLD AUTO: 0.6 K/UL — HIGH (ref 0–0.5)
EOSINOPHIL NFR BLD AUTO: 5.6 % — SIGNIFICANT CHANGE UP (ref 0–6)
GLUCOSE SERPL-MCNC: 97 MG/DL — SIGNIFICANT CHANGE UP (ref 70–99)
HCT VFR BLD CALC: 45.1 % — SIGNIFICANT CHANGE UP (ref 39–50)
HGB BLD-MCNC: 15.4 G/DL — SIGNIFICANT CHANGE UP (ref 13–17)
IANC: 5.4 K/UL — SIGNIFICANT CHANGE UP (ref 1.8–7.4)
IMM GRANULOCYTES NFR BLD AUTO: 0.6 % — SIGNIFICANT CHANGE UP (ref 0–0.9)
LITHIUM SERPL-MCNC: 1 MMOL/L — SIGNIFICANT CHANGE UP (ref 0.6–1.2)
LYMPHOCYTES # BLD AUTO: 3.74 K/UL — HIGH (ref 1–3.3)
LYMPHOCYTES # BLD AUTO: 34.6 % — SIGNIFICANT CHANGE UP (ref 13–44)
MCHC RBC-ENTMCNC: 29.7 PG — SIGNIFICANT CHANGE UP (ref 27–34)
MCHC RBC-ENTMCNC: 34.1 GM/DL — SIGNIFICANT CHANGE UP (ref 32–36)
MCV RBC AUTO: 87.1 FL — SIGNIFICANT CHANGE UP (ref 80–100)
MONOCYTES # BLD AUTO: 0.95 K/UL — HIGH (ref 0–0.9)
MONOCYTES NFR BLD AUTO: 8.8 % — SIGNIFICANT CHANGE UP (ref 2–14)
NEUTROPHILS # BLD AUTO: 5.4 K/UL — SIGNIFICANT CHANGE UP (ref 1.8–7.4)
NEUTROPHILS NFR BLD AUTO: 49.9 % — SIGNIFICANT CHANGE UP (ref 43–77)
NRBC # BLD: 0 /100 WBCS — SIGNIFICANT CHANGE UP (ref 0–0)
NRBC # FLD: 0 K/UL — SIGNIFICANT CHANGE UP (ref 0–0)
PLATELET # BLD AUTO: 333 K/UL — SIGNIFICANT CHANGE UP (ref 150–400)
POTASSIUM SERPL-MCNC: 4.1 MMOL/L — SIGNIFICANT CHANGE UP (ref 3.5–5.3)
POTASSIUM SERPL-SCNC: 4.1 MMOL/L — SIGNIFICANT CHANGE UP (ref 3.5–5.3)
PROT SERPL-MCNC: 7.9 G/DL — SIGNIFICANT CHANGE UP (ref 6–8.3)
RBC # BLD: 5.18 M/UL — SIGNIFICANT CHANGE UP (ref 4.2–5.8)
RBC # FLD: 12.6 % — SIGNIFICANT CHANGE UP (ref 10.3–14.5)
SODIUM SERPL-SCNC: 138 MMOL/L — SIGNIFICANT CHANGE UP (ref 135–145)
VALPROATE SERPL-MCNC: 70.7 UG/ML — SIGNIFICANT CHANGE UP (ref 50–100)
WBC # BLD: 10.8 K/UL — HIGH (ref 3.8–10.5)
WBC # FLD AUTO: 10.8 K/UL — HIGH (ref 3.8–10.5)

## 2024-02-22 PROCEDURE — 93010 ELECTROCARDIOGRAM REPORT: CPT

## 2024-02-22 RX ORDER — PALIPERIDONE 1.5 MG/1
117 TABLET, EXTENDED RELEASE ORAL ONCE
Refills: 0 | Status: CANCELLED | OUTPATIENT
Start: 2024-03-21 | End: 2024-02-23

## 2024-02-22 RX ORDER — RISPERIDONE 4 MG/1
1 TABLET ORAL
Qty: 15 | Refills: 0
Start: 2024-02-22 | End: 2024-03-07

## 2024-02-22 RX ORDER — PALIPERIDONE 1.5 MG/1
1 TABLET, EXTENDED RELEASE ORAL
Qty: 0 | Refills: 0 | DISCHARGE
Start: 2024-02-22

## 2024-02-22 RX ORDER — LITHIUM CARBONATE 300 MG/1
2 TABLET, EXTENDED RELEASE ORAL
Qty: 30 | Refills: 0
Start: 2024-02-22 | End: 2024-03-07

## 2024-02-22 RX ORDER — RISPERIDONE 4 MG/1
2 TABLET ORAL AT BEDTIME
Refills: 0 | Status: DISCONTINUED | OUTPATIENT
Start: 2024-02-22 | End: 2024-02-23

## 2024-02-22 RX ORDER — DIVALPROEX SODIUM 500 MG/1
3 TABLET, DELAYED RELEASE ORAL
Qty: 45 | Refills: 0
Start: 2024-02-22 | End: 2024-03-07

## 2024-02-22 RX ADMIN — LITHIUM CARBONATE 900 MILLIGRAM(S): 300 TABLET, EXTENDED RELEASE ORAL at 20:39

## 2024-02-22 RX ADMIN — PALIPERIDONE 156 MILLIGRAM(S): 1.5 TABLET, EXTENDED RELEASE ORAL at 09:34

## 2024-02-22 RX ADMIN — DIVALPROEX SODIUM 1500 MILLIGRAM(S): 500 TABLET, DELAYED RELEASE ORAL at 20:39

## 2024-02-22 RX ADMIN — RISPERIDONE 2 MILLIGRAM(S): 4 TABLET ORAL at 20:39

## 2024-02-22 NOTE — BH INPATIENT PSYCHIATRY PROGRESS NOTE - NSTXDCOPLKDATEEST_PSY_ALL_CORE
05-Feb-2024
09-Feb-2024
09-Feb-2024
05-Feb-2024
05-Feb-2024
12-Feb-2024
03-Feb-2024
05-Feb-2024
09-Feb-2024
05-Feb-2024
05-Feb-2024
12-Feb-2024
12-Feb-2024
05-Feb-2024
12-Feb-2024

## 2024-02-22 NOTE — BH INPATIENT PSYCHIATRY PROGRESS NOTE - NSBHMETABOLIC_PSY_ALL_CORE_FT
BMI: BMI (kg/m2): 33.7 (02-03-24 @ 23:55)  HbA1c: A1C with Estimated Average Glucose Result: 5.6 % (02-05-24 @ 09:00)    Glucose:   BP: --Vital Signs Last 24 Hrs  T(C): 36.3 (02-23-24 @ 08:37), Max: 36.3 (02-23-24 @ 08:37)  T(F): 97.4 (02-23-24 @ 08:37), Max: 97.4 (02-23-24 @ 08:37)  HR: --  BP: --  BP(mean): --  RR: --  SpO2: --    Orthostatic VS  02-23-24 @ 08:37  Lying BP: --/-- HR: --  Sitting BP: 95/62 HR: 86  Standing BP: 92/66 HR: 103  Site: --  Mode: --  Orthostatic VS  02-22-24 @ 07:55  Lying BP: --/-- HR: --  Sitting BP: 92/60 HR: 87  Standing BP: 95/59 HR: 103  Site: --  Mode: --    Lipid Panel: Date/Time: 02-05-24 @ 09:00  Cholesterol, Serum: 135  LDL Cholesterol Calculated: 57  HDL Cholesterol, Serum: 48  Total Cholesterol/HDL Ration Measurement: --  Triglycerides, Serum: 149

## 2024-02-22 NOTE — BH INPATIENT PSYCHIATRY PROGRESS NOTE - NSTXMANICPROGRES_PSY_ALL_CORE
No Change
Improving
No Change
Improving
Met - goal discontinued
Improving
Improving
No Change
No Change

## 2024-02-22 NOTE — BH INPATIENT PSYCHIATRY PROGRESS NOTE - NSBHATTESTTYPEVISIT_PSY_A_CORE
[place] without on-site Attending supervision
[place] without on-site Attending supervision
Attending Only
EVERTON without on-site Attending supervision
EVERTON without on-site Attending supervision
Attending Only
EVERTON without on-site Attending supervision
Attending Only
EVERTON without on-site Attending supervision

## 2024-02-22 NOTE — BH INPATIENT PSYCHIATRY PROGRESS NOTE - NSTXANXDATETRGT_PSY_ALL_CORE
14-Feb-2024
25-Feb-2024
14-Feb-2024
11-Feb-2024
18-Feb-2024
11-Feb-2024
11-Feb-2024
14-Feb-2024
23-Feb-2024
14-Feb-2024
18-Feb-2024
18-Feb-2024
25-Feb-2024

## 2024-02-22 NOTE — BH INPATIENT PSYCHIATRY PROGRESS NOTE - NSTXANXGOAL_PSY_ALL_CORE
Identify and practice 3 coping skills to manage anxiety

## 2024-02-22 NOTE — BH INPATIENT PSYCHIATRY PROGRESS NOTE - NSTXCOPEDATEEST_PSY_ALL_CORE
04-Feb-2024
03-Feb-2024
04-Feb-2024

## 2024-02-22 NOTE — BH INPATIENT PSYCHIATRY PROGRESS NOTE - NSBHCONSBHPROVDETAILS_PSY_A_CORE  FT
Message left for Dr. Luo at [phone].
Message left for Dr. Luo at [phone].
Message left for Dr. Luo at [phone].
Message left for Dr. Luo at [phone].
Message left for Dr. Luo at [phone].
Message left for Dr. Luo at [phone].
Message left for Dr. Luo at [phone].
Message left for Dr. Luo at [phone].
Message left for Dr. Luo at [phone].
Message left for Dr. Luo at [phone].
Message left for Dr. Luo at [phone].
Message left for Dr. Luo at [phone].
Message left for Dr. Luo at [phone].
Message left for Dr. Luo at [phone].
Message left for Dr. Luo at [phone].

## 2024-02-22 NOTE — BH INPATIENT PSYCHIATRY PROGRESS NOTE - NSTXANXDATEEST_PSY_ALL_CORE
04-Feb-2024
03-Feb-2024
04-Feb-2024
03-Feb-2024
03-Feb-2024

## 2024-02-22 NOTE — BH INPATIENT PSYCHIATRY PROGRESS NOTE - NSBHFUPINTERVALCCFT_PSY_A_CORE
Iwona
reported feeling  "good". 
reported feeling  "better". 
reported feeling  "good". 
Iwona
reported feeling  "good". 
Iwona
follow up mood 
reported feeling "ok". 
reported feeling  "better". 
Iwona
reported feeling  "better".

## 2024-02-22 NOTE — BH INPATIENT PSYCHIATRY PROGRESS NOTE - NSDCCRITERIA_PSY_ALL_CORE
symptom management, safety planning, care coordination

## 2024-02-22 NOTE — BH INPATIENT PSYCHIATRY PROGRESS NOTE - NSTXDCOPLKPROGRES_PSY_ALL_CORE
Improving
No Change
Improving
Met - goal discontinued
No Change
No Change
Improving
No Change
No Change
Improving
Improving

## 2024-02-22 NOTE — BH INPATIENT PSYCHIATRY PROGRESS NOTE - NSBHCHARTREVIEWVS_PSY_A_CORE FT
Vital Signs Last 24 Hrs  T(C): 36.3 (02-23-24 @ 08:37), Max: 36.3 (02-23-24 @ 08:37)  T(F): 97.4 (02-23-24 @ 08:37), Max: 97.4 (02-23-24 @ 08:37)  HR: --  BP: --  BP(mean): --  RR: --  SpO2: --    Orthostatic VS  02-23-24 @ 08:37  Lying BP: --/-- HR: --  Sitting BP: 95/62 HR: 86  Standing BP: 92/66 HR: 103  Site: --  Mode: --  Orthostatic VS  02-22-24 @ 07:55  Lying BP: --/-- HR: --  Sitting BP: 92/60 HR: 87  Standing BP: 95/59 HR: 103  Site: --  Mode: --

## 2024-02-22 NOTE — BH INPATIENT PSYCHIATRY PROGRESS NOTE - NSTXDEPRESDATEEST_PSY_ALL_CORE
04-Feb-2024
03-Feb-2024
04-Feb-2024

## 2024-02-22 NOTE — BH INPATIENT PSYCHIATRY PROGRESS NOTE - PRN MEDS
MEDICATIONS  (PRN):  acetaminophen     Tablet .. 650 milliGRAM(s) Oral every 6 hours PRN Temp greater or equal to 38C (100.4F), Mild Pain (1 - 3)  aluminum hydroxide/magnesium hydroxide/simethicone Suspension 30 milliLiter(s) Oral every 6 hours PRN Dyspepsia  diphenhydrAMINE 50 milliGRAM(s) Oral every 6 hours PRN Extrapyramidal symptoms or prophylaxis  diphenhydrAMINE Injectable 50 milliGRAM(s) IntraMuscular Once PRN agitation  haloperidol     Tablet 5 milliGRAM(s) Oral every 6 hours PRN agitation  haloperidol    Injectable 5 milliGRAM(s) IntraMuscular Once PRN agitation  LORazepam     Tablet 2 milliGRAM(s) Oral every 6 hours PRN Anxiety  LORazepam   Injectable 2 milliGRAM(s) IntraMuscular Once PRN Agitation  magnesium hydroxide Suspension 30 milliLiter(s) Oral daily PRN Constipation  QUEtiapine 50 milliGRAM(s) Oral at bedtime PRN insomnia  

## 2024-02-22 NOTE — BH INPATIENT PSYCHIATRY PROGRESS NOTE - NSTXDCOPLKDATENEW_PSY_ALL_CORE
12-Feb-2024
19-Feb-2024
12-Feb-2024
19-Feb-2024
12-Feb-2024
19-Feb-2024

## 2024-02-22 NOTE — BH INPATIENT PSYCHIATRY PROGRESS NOTE - MSE UNSTRUCTURED FT
The patient appears stated age, with fair hygiene, and is dressed appropriately.    He was calm and superficially cooperative with the interview.  He maintained appropriate eye contact.  No restlessness or slowing of movements observed.  Gait is steady.  The patient’s speech was fluent, normal in tone, increased in rate, and normal in volume.  The patient’s mood is “good”  His affect is constricted, but stable, appropriate.  The patient’s thoughts are more goal directed.  He does not have any clear delusions or hallucinations.  He denies any suicidal or homicidal ideation, intent, or plan.  Insight is partial.  Judgment is fair.  Impulse control has been fair on the unit.
The patient appears stated age, with fair hygiene, and is dressed appropriately.    He was calm and superficially engaged with the interview.  He maintained appropriate eye contact.  No restlessness or slowing of movements observed.  Gait is steady.  The patient’s speech was fluent, normal in tone, increased in rate, and normal in volume.  The patient’s mood is “good”  His affect is neutral, appropriate.  The patient’s thoughts are more goal directed.  He does not have any clear delusions or hallucinations.  He denies any suicidal or homicidal ideation, intent, or plan.  Insight is partial.  Judgment is fair.  Impulse control has been fair on the unit.
The patient appears stated age, with fair hygiene, and is dressed appropriately.    He was calm and superficially engaged with the interview.  He maintained appropriate eye contact.  No restlessness or slowing of movements observed.  Gait is steady.  The patient’s speech was fluent, normal in tone, increased in rate, and normal in volume.  The patient’s mood is “good”  His affect is neutral, appropriate.  The patient’s thoughts are more goal directed.  He does not have any clear delusions or hallucinations.  He denies any suicidal or homicidal ideation, intent, or plan.  Insight is partial.  Judgment is fair.  Impulse control has been fair on the unit.
The patient appears stated age, with fair hygiene, and is dressed appropriately.  He was calm and makes attempts to be cooperative with the interview.  He maintained appropriate eye contact.  No restlessness or slowing of movements observed.  Gait is steady.  The patient’s speech was fluent, normal in tone, increased in rate, and normal in volume.  The patient’s mood is “anxious.”  His affect is full, stable, appropriate.  The patient’s thoughts are tangential, loose at times, unable to provide details.  He does not have any clear delusions or hallucinations.  He admits to vague SI, denies any active suicidal or homicidal ideation, intent, or plan.  Insight is partial.  Judgment is fair.  Impulse control has been fair on the unit.
The patient appears stated age, with fair hygiene, and is dressed appropriately.  Lying in bed, feels sick today.  He was calm and cooperative with the interview.  He maintained appropriate eye contact.  No restlessness or slowing of movements observed.  Gait is steady.  The patient’s speech was fluent, normal in tone, increased in rate, and normal in volume.  The patient’s mood is “okay.”  His affect is constricted, but stable, appropriate.  The patient’s thoughts are more goal directed.  He does not have any clear delusions or hallucinations.  He denies any suicidal or homicidal ideation, intent, or plan.  Insight is partial.  Judgment is fair.  Impulse control has been fair on the unit.
The patient appears stated age, with fair hygiene, and is dressed appropriately.  He was calm and cooperative with the interview.  He maintained appropriate eye contact.  No restlessness or slowing of movements observed.  Gait is steady.  The patient’s speech was fluent, normal in tone, increased in rate, and normal in volume.  The patient’s mood is “good.”  His affect is bright, stable, appropriate.  The patient’s thoughts are tangential, loose at times, but more goal directed.  He does not have any clear delusions or hallucinations.  He denies any suicidal or homicidal ideation, intent, or plan.  Insight is partial.  Judgment is fair.  Impulse control has been fair on the unit.
The patient appears stated age, with fair hygiene, and is dressed appropriately.    He was calm and superficially engaged with the interview.  He maintained appropriate eye contact.  No restlessness or slowing of movements observed.  Gait is steady.  The patient’s speech was fluent, normal in tone, increased in rate, and normal in volume.  The patient’s mood is “better”  His affect is constricted, but stable, appropriate.  The patient’s thoughts are more goal directed.  He does not have any clear delusions or hallucinations.  He denies any suicidal or homicidal ideation, intent, or plan.  Insight is partial.  Judgment is fair.  Impulse control has been fair on the unit.
The patient appears stated age, with fair hygiene, and is dressed appropriately.  He was calm and cooperative with the interview.  He maintained appropriate eye contact.  No restlessness or slowing of movements observed.  Gait is steady.  The patient’s speech was fluent, normal in tone, increased in rate, and normal in volume.  The patient’s mood is “better.”  His affect is bright, stable, appropriate.  The patient’s thoughts are tangential, loose at times.  He does not have any clear delusions or hallucinations.  He denies any suicidal or homicidal ideation, intent, or plan.  Insight is partial.  Judgment is fair.  Impulse control has been fair on the unit.
The patient appears stated age, with fair hygiene, and is dressed appropriately.    He was calm and superficially engaged with the interview.  He maintained appropriate eye contact.  No restlessness or slowing of movements observed.  Gait is steady.  The patient’s speech was fluent, normal in tone, increased in rate, and normal in volume.  The patient’s mood is “good”  His affect is neutral, appropriate.  The patient’s thoughts are more goal directed.  He does not have any clear delusions or hallucinations.  He denies any suicidal or homicidal ideation, intent, or plan.  Insight is partial.  Judgment is fair.  Impulse control has been fair on the unit.
The patient appears stated age, with fair hygiene, and is dressed appropriately.  Lying in bed, feels sick today.  He was calm and cooperative with the interview.  He maintained appropriate eye contact.  No restlessness or slowing of movements observed.  Gait is steady.  The patient’s speech was fluent, normal in tone, increased in rate, and normal in volume.  The patient’s mood is “okay.”  His affect is constricted, but stable, appropriate.  The patient’s thoughts are more goal directed.  He does not have any clear delusions or hallucinations.  He denies any suicidal or homicidal ideation, intent, or plan.  Insight is partial.  Judgment is fair.  Impulse control has been fair on the unit.
The patient appears stated age, with fair hygiene, and is dressed appropriately.  He was calm and cooperative with the interview.  He maintained appropriate eye contact.  No restlessness or slowing of movements observed.  Gait is steady.  The patient’s speech was fluent, normal in tone, increased in rate, and normal in volume.  The patient’s mood is “better.”  His affect is bright, stable, appropriate.  The patient’s thoughts are tangential, loose at times.  He does not have any clear delusions or hallucinations.  He denies any suicidal or homicidal ideation, intent, or plan.  Insight is partial.  Judgment is fair.  Impulse control has been fair on the unit.
The patient appears stated age, with fair hygiene, and is dressed appropriately.  Lying in bed, feels sick today.  He was calm and cooperative with the interview.  He maintained appropriate eye contact.  No restlessness or slowing of movements observed.  Gait is steady.  The patient’s speech was fluent, normal in tone, increased in rate, and normal in volume.  The patient’s mood is “okay.”  His affect is constricted, but stable, appropriate.  The patient’s thoughts are more goal directed.  He does not have any clear delusions or hallucinations.  He denies any suicidal or homicidal ideation, intent, or plan.  Insight is partial.  Judgment is fair.  Impulse control has been fair on the unit.
The patient appears stated age, with fair hygiene, and is dressed appropriately.    He was calm and cooperative with the interview.  He maintained appropriate eye contact.  No restlessness or slowing of movements observed.  Gait is steady.  The patient’s speech was fluent, normal in tone, increased in rate, and normal in volume.  The patient’s mood is “okay.”  His affect is constricted, but stable, appropriate.  The patient’s thoughts are more goal directed.  He does not have any clear delusions or hallucinations.  He denies any suicidal or homicidal ideation, intent, or plan.  Insight is partial.  Judgment is fair.  Impulse control has been fair on the unit.
The patient appears stated age, with fair hygiene, and is dressed appropriately.    He was calm and superficially engaged with the interview.  He maintained appropriate eye contact.  No restlessness or slowing of movements observed.  Gait is steady.  The patient’s speech was fluent, normal in tone, increased in rate, and normal in volume.  The patient’s mood is “good”  His affect is neutral, appropriate.  The patient’s thoughts are more goal directed.  He does not have any clear delusions or hallucinations.  He denies any suicidal or homicidal ideation, intent, or plan.  Insight is partial.  Judgment is fair.  Impulse control has been fair on the unit.
The patient appears stated age, with fair hygiene, and is dressed appropriately.    He was calm and superficially engaged with the interview.  He maintained appropriate eye contact.  No restlessness or slowing of movements observed.  Gait is steady.  The patient’s speech was fluent, normal in tone, increased in rate, and normal in volume.  The patient’s mood is “good”  His affect is neutral, appropriate.  The patient’s thoughts are more goal directed.  He does not have any clear delusions or hallucinations.  He denies any suicidal or homicidal ideation, intent, or plan.  Insight is partial.  Judgment is fair.  Impulse control has been fair on the unit.

## 2024-02-22 NOTE — BH INPATIENT PSYCHIATRY PROGRESS NOTE - NSTXMANICGOAL_PSY_ALL_CORE
Be able to identify the early signs of sophie (e.g. sleep and mood changes) and to employ coping strategies to minimize acting out
Demonstrate a substantial reduction in both hyperactive pacing on the unit and social intrusiveness
Be able to identify the early signs of sophie (e.g. sleep and mood changes) and to employ coping strategies to minimize acting out

## 2024-02-22 NOTE — BH INPATIENT PSYCHIATRY PROGRESS NOTE - NSTXMANICDATETRGT_PSY_ALL_CORE
14-Feb-2024
14-Feb-2024
23-Feb-2024
11-Feb-2024
14-Feb-2024
11-Feb-2024
14-Feb-2024
11-Feb-2024

## 2024-02-22 NOTE — BH INPATIENT PSYCHIATRY PROGRESS NOTE - NSTXDCOPLKDATETRGT_PSY_ALL_CORE
12-Feb-2024
16-Feb-2024
12-Feb-2024
23-Feb-2024
16-Feb-2024
16-Feb-2024
14-Feb-2024
19-Feb-2024
14-Feb-2024
19-Feb-2024
14-Feb-2024
19-Feb-2024
12-Feb-2024
19-Feb-2024
14-Feb-2024

## 2024-02-22 NOTE — BH INPATIENT PSYCHIATRY PROGRESS NOTE - NSTXDEPRESPROGRES_PSY_ALL_CORE
Improving
Improving
No Change
Improving
No Change
No Change
Improving
No Change
No Change
Met - goal discontinued

## 2024-02-22 NOTE — BH INPATIENT PSYCHIATRY PROGRESS NOTE - NSTXANXPROGRES_PSY_ALL_CORE
Improving
Improving
No Change
Improving
No Change
No Change
Improving
No Change
No Change
Improving
Improving
Met - goal discontinued
Improving

## 2024-02-22 NOTE — BH INPATIENT PSYCHIATRY PROGRESS NOTE - NSTXCOPEDATETRGT_PSY_ALL_CORE
11-Feb-2024
14-Feb-2024
23-Feb-2024
11-Feb-2024
14-Feb-2024
11-Feb-2024
14-Feb-2024

## 2024-02-22 NOTE — BH INPATIENT PSYCHIATRY PROGRESS NOTE - NSBHFUPINTERVALHXFT_PSY_A_CORE
Pt is seen and evaluated for f/up for SAD, chart is reviewed and care discussed w/ tx team, VSS. no overnight issues. agreed to OLIVIA--received invega sustenna 234mg IM on 2/16.  Patient is more visible and less isolative.  Pt is now discharge focused, reported feeling better, denied racing thoughts, paranoia or suicidal ideation.  Tolerating medications with no side effects, no td, eps or tremors observed/ reported. denied dizziness.  No agitation and in good behavioral control.  In no apparent distress, no somatic complaints.  Discharge planning being pursued and d/w patient and SW.  Labwork ordered for tomorrow AM and will seek coordination with family and outpatient psychiatric team.  Scheduled for 2nd loading dose of OLIVIA Invega Sustenna 156mg tomorrow on 2/22. Pt is seen and evaluated for f/up for SAD, chart is reviewed and care discussed w/ tx team, VSS. no overnight issues. agreed to OLIVIA--received invega sustenna 234mg IM on 2/16.  Patient is more visible and less isolative.  Pt is now discharge focused, reported feeling better, denied racing thoughts, paranoia or suicidal ideation.  Tolerating medications with no side effects, no td, eps or tremors observed/ reported. denied dizziness.  No agitation and in good behavioral control.  In no apparent distress, no somatic complaints.  Discharge planning being pursued for tomorrow and d/w patient and SW.  Lab work reviewed and wnl, VPA and Li levels thereapeutic.  Received 2nd loading dose of OLIVIA Invega Sustenna 156mg today, well tolerated.

## 2024-02-22 NOTE — BH INPATIENT PSYCHIATRY PROGRESS NOTE - CURRENT MEDICATION
MEDICATIONS  (STANDING):  divalproex ER 1500 milliGRAM(s) Oral at bedtime  influenza   Vaccine 0.5 milliLiter(s) IntraMuscular once  lithium CR (ESKALITH-CR) 900 milliGRAM(s) Oral at bedtime  OLANZapine 20 milliGRAM(s) Oral at bedtime    MEDICATIONS  (PRN):  acetaminophen     Tablet .. 650 milliGRAM(s) Oral every 6 hours PRN Temp greater or equal to 38C (100.4F), Mild Pain (1 - 3)  aluminum hydroxide/magnesium hydroxide/simethicone Suspension 30 milliLiter(s) Oral every 6 hours PRN Dyspepsia  diphenhydrAMINE 50 milliGRAM(s) Oral every 6 hours PRN Extrapyramidal symptoms or prophylaxis  diphenhydrAMINE Injectable 50 milliGRAM(s) IntraMuscular Once PRN agitation  haloperidol     Tablet 5 milliGRAM(s) Oral every 6 hours PRN agitation  haloperidol    Injectable 5 milliGRAM(s) IntraMuscular Once PRN agitation  LORazepam     Tablet 2 milliGRAM(s) Oral every 6 hours PRN Anxiety  LORazepam   Injectable 2 milliGRAM(s) IntraMuscular Once PRN Agitation  magnesium hydroxide Suspension 30 milliLiter(s) Oral daily PRN Constipation  QUEtiapine 50 milliGRAM(s) Oral at bedtime PRN insomnia  
MEDICATIONS  (STANDING):  divalproex ER 1500 milliGRAM(s) Oral at bedtime  influenza   Vaccine 0.5 milliLiter(s) IntraMuscular once  lithium CR (ESKALITH-CR) 900 milliGRAM(s) Oral at bedtime  OLANZapine 15 milliGRAM(s) Oral at bedtime  risperiDONE   Tablet 2 milliGRAM(s) Oral at bedtime    MEDICATIONS  (PRN):  acetaminophen     Tablet .. 650 milliGRAM(s) Oral every 6 hours PRN Temp greater or equal to 38C (100.4F), Mild Pain (1 - 3)  aluminum hydroxide/magnesium hydroxide/simethicone Suspension 30 milliLiter(s) Oral every 6 hours PRN Dyspepsia  diphenhydrAMINE 50 milliGRAM(s) Oral every 6 hours PRN Extrapyramidal symptoms or prophylaxis  diphenhydrAMINE Injectable 50 milliGRAM(s) IntraMuscular Once PRN agitation  haloperidol     Tablet 5 milliGRAM(s) Oral every 6 hours PRN agitation  haloperidol    Injectable 5 milliGRAM(s) IntraMuscular Once PRN agitation  LORazepam     Tablet 2 milliGRAM(s) Oral every 6 hours PRN Anxiety  LORazepam   Injectable 2 milliGRAM(s) IntraMuscular Once PRN Agitation  magnesium hydroxide Suspension 30 milliLiter(s) Oral daily PRN Constipation  QUEtiapine 50 milliGRAM(s) Oral at bedtime PRN insomnia  
MEDICATIONS  (STANDING):  divalproex ER 1500 milliGRAM(s) Oral at bedtime  influenza   Vaccine 0.5 milliLiter(s) IntraMuscular once  lithium CR (ESKALITH-CR) 900 milliGRAM(s) Oral at bedtime  risperiDONE   Tablet 3 milliGRAM(s) Oral at bedtime    MEDICATIONS  (PRN):  acetaminophen     Tablet .. 650 milliGRAM(s) Oral every 6 hours PRN Temp greater or equal to 38C (100.4F), Mild Pain (1 - 3)  aluminum hydroxide/magnesium hydroxide/simethicone Suspension 30 milliLiter(s) Oral every 6 hours PRN Dyspepsia  diphenhydrAMINE 50 milliGRAM(s) Oral every 6 hours PRN Extrapyramidal symptoms or prophylaxis  diphenhydrAMINE Injectable 50 milliGRAM(s) IntraMuscular Once PRN agitation  haloperidol     Tablet 5 milliGRAM(s) Oral every 6 hours PRN agitation  haloperidol    Injectable 5 milliGRAM(s) IntraMuscular Once PRN agitation  LORazepam     Tablet 2 milliGRAM(s) Oral every 6 hours PRN Anxiety  LORazepam   Injectable 2 milliGRAM(s) IntraMuscular Once PRN Agitation  magnesium hydroxide Suspension 30 milliLiter(s) Oral daily PRN Constipation  QUEtiapine 50 milliGRAM(s) Oral at bedtime PRN insomnia  
MEDICATIONS  (STANDING):  divalproex ER 1500 milliGRAM(s) Oral at bedtime  influenza   Vaccine 0.5 milliLiter(s) IntraMuscular once  lithium CR (ESKALITH-CR) 900 milliGRAM(s) Oral at bedtime  risperiDONE   Tablet 3 milliGRAM(s) Oral at bedtime    MEDICATIONS  (PRN):  acetaminophen     Tablet .. 650 milliGRAM(s) Oral every 6 hours PRN Temp greater or equal to 38C (100.4F), Mild Pain (1 - 3)  aluminum hydroxide/magnesium hydroxide/simethicone Suspension 30 milliLiter(s) Oral every 6 hours PRN Dyspepsia  diphenhydrAMINE 50 milliGRAM(s) Oral every 6 hours PRN Extrapyramidal symptoms or prophylaxis  diphenhydrAMINE Injectable 50 milliGRAM(s) IntraMuscular Once PRN agitation  haloperidol     Tablet 5 milliGRAM(s) Oral every 6 hours PRN agitation  haloperidol    Injectable 5 milliGRAM(s) IntraMuscular Once PRN agitation  LORazepam     Tablet 2 milliGRAM(s) Oral every 6 hours PRN Anxiety  LORazepam   Injectable 2 milliGRAM(s) IntraMuscular Once PRN Agitation  magnesium hydroxide Suspension 30 milliLiter(s) Oral daily PRN Constipation  QUEtiapine 50 milliGRAM(s) Oral at bedtime PRN insomnia  
MEDICATIONS  (STANDING):  divalproex ER 1500 milliGRAM(s) Oral at bedtime  influenza   Vaccine 0.5 milliLiter(s) IntraMuscular once  lithium CR (ESKALITH-CR) 900 milliGRAM(s) Oral at bedtime  OLANZapine 15 milliGRAM(s) Oral at bedtime  risperiDONE   Tablet 2 milliGRAM(s) Oral at bedtime    MEDICATIONS  (PRN):  acetaminophen     Tablet .. 650 milliGRAM(s) Oral every 6 hours PRN Temp greater or equal to 38C (100.4F), Mild Pain (1 - 3)  aluminum hydroxide/magnesium hydroxide/simethicone Suspension 30 milliLiter(s) Oral every 6 hours PRN Dyspepsia  diphenhydrAMINE 50 milliGRAM(s) Oral every 6 hours PRN Extrapyramidal symptoms or prophylaxis  diphenhydrAMINE Injectable 50 milliGRAM(s) IntraMuscular Once PRN agitation  haloperidol     Tablet 5 milliGRAM(s) Oral every 6 hours PRN agitation  haloperidol    Injectable 5 milliGRAM(s) IntraMuscular Once PRN agitation  LORazepam     Tablet 2 milliGRAM(s) Oral every 6 hours PRN Anxiety  LORazepam   Injectable 2 milliGRAM(s) IntraMuscular Once PRN Agitation  magnesium hydroxide Suspension 30 milliLiter(s) Oral daily PRN Constipation  QUEtiapine 50 milliGRAM(s) Oral at bedtime PRN insomnia  
MEDICATIONS  (STANDING):  divalproex ER 1500 milliGRAM(s) Oral at bedtime  influenza   Vaccine 0.5 milliLiter(s) IntraMuscular once  lithium CR (ESKALITH-CR) 900 milliGRAM(s) Oral at bedtime  OLANZapine 20 milliGRAM(s) Oral at bedtime    MEDICATIONS  (PRN):  acetaminophen     Tablet .. 650 milliGRAM(s) Oral every 6 hours PRN Temp greater or equal to 38C (100.4F), Mild Pain (1 - 3)  aluminum hydroxide/magnesium hydroxide/simethicone Suspension 30 milliLiter(s) Oral every 6 hours PRN Dyspepsia  diphenhydrAMINE 50 milliGRAM(s) Oral every 6 hours PRN Extrapyramidal symptoms or prophylaxis  diphenhydrAMINE Injectable 50 milliGRAM(s) IntraMuscular Once PRN agitation  haloperidol     Tablet 5 milliGRAM(s) Oral every 6 hours PRN agitation  haloperidol    Injectable 5 milliGRAM(s) IntraMuscular Once PRN agitation  LORazepam     Tablet 2 milliGRAM(s) Oral every 6 hours PRN Anxiety  LORazepam   Injectable 2 milliGRAM(s) IntraMuscular Once PRN Agitation  magnesium hydroxide Suspension 30 milliLiter(s) Oral daily PRN Constipation  QUEtiapine 50 milliGRAM(s) Oral at bedtime PRN insomnia  
MEDICATIONS  (STANDING):  divalproex ER 1500 milliGRAM(s) Oral at bedtime  influenza   Vaccine 0.5 milliLiter(s) IntraMuscular once  lithium CR (ESKALITH-CR) 900 milliGRAM(s) Oral at bedtime  OLANZapine 10 milliGRAM(s) Oral at bedtime  risperiDONE   Tablet 3 milliGRAM(s) Oral at bedtime    MEDICATIONS  (PRN):  acetaminophen     Tablet .. 650 milliGRAM(s) Oral every 6 hours PRN Temp greater or equal to 38C (100.4F), Mild Pain (1 - 3)  aluminum hydroxide/magnesium hydroxide/simethicone Suspension 30 milliLiter(s) Oral every 6 hours PRN Dyspepsia  diphenhydrAMINE 50 milliGRAM(s) Oral every 6 hours PRN Extrapyramidal symptoms or prophylaxis  diphenhydrAMINE Injectable 50 milliGRAM(s) IntraMuscular Once PRN agitation  haloperidol     Tablet 5 milliGRAM(s) Oral every 6 hours PRN agitation  haloperidol    Injectable 5 milliGRAM(s) IntraMuscular Once PRN agitation  LORazepam     Tablet 2 milliGRAM(s) Oral every 6 hours PRN Anxiety  LORazepam   Injectable 2 milliGRAM(s) IntraMuscular Once PRN Agitation  magnesium hydroxide Suspension 30 milliLiter(s) Oral daily PRN Constipation  QUEtiapine 50 milliGRAM(s) Oral at bedtime PRN insomnia  
MEDICATIONS  (STANDING):  divalproex ER 1500 milliGRAM(s) Oral at bedtime  influenza   Vaccine 0.5 milliLiter(s) IntraMuscular once  lithium CR (ESKALITH-CR) 900 milliGRAM(s) Oral at bedtime  OLANZapine 15 milliGRAM(s) Oral at bedtime  risperiDONE   Tablet 2 milliGRAM(s) Oral at bedtime    MEDICATIONS  (PRN):  acetaminophen     Tablet .. 650 milliGRAM(s) Oral every 6 hours PRN Temp greater or equal to 38C (100.4F), Mild Pain (1 - 3)  aluminum hydroxide/magnesium hydroxide/simethicone Suspension 30 milliLiter(s) Oral every 6 hours PRN Dyspepsia  diphenhydrAMINE 50 milliGRAM(s) Oral every 6 hours PRN Extrapyramidal symptoms or prophylaxis  diphenhydrAMINE Injectable 50 milliGRAM(s) IntraMuscular Once PRN agitation  haloperidol     Tablet 5 milliGRAM(s) Oral every 6 hours PRN agitation  haloperidol    Injectable 5 milliGRAM(s) IntraMuscular Once PRN agitation  LORazepam     Tablet 2 milliGRAM(s) Oral every 6 hours PRN Anxiety  LORazepam   Injectable 2 milliGRAM(s) IntraMuscular Once PRN Agitation  magnesium hydroxide Suspension 30 milliLiter(s) Oral daily PRN Constipation  QUEtiapine 50 milliGRAM(s) Oral at bedtime PRN insomnia  
MEDICATIONS  (STANDING):  divalproex ER 1500 milliGRAM(s) Oral at bedtime  influenza   Vaccine 0.5 milliLiter(s) IntraMuscular once  lithium CR (ESKALITH-CR) 900 milliGRAM(s) Oral at bedtime  risperiDONE   Tablet 2 milliGRAM(s) Oral at bedtime    MEDICATIONS  (PRN):  acetaminophen     Tablet .. 650 milliGRAM(s) Oral every 6 hours PRN Temp greater or equal to 38C (100.4F), Mild Pain (1 - 3)  aluminum hydroxide/magnesium hydroxide/simethicone Suspension 30 milliLiter(s) Oral every 6 hours PRN Dyspepsia  diphenhydrAMINE 50 milliGRAM(s) Oral every 6 hours PRN Extrapyramidal symptoms or prophylaxis  diphenhydrAMINE Injectable 50 milliGRAM(s) IntraMuscular Once PRN agitation  haloperidol     Tablet 5 milliGRAM(s) Oral every 6 hours PRN agitation  haloperidol    Injectable 5 milliGRAM(s) IntraMuscular Once PRN agitation  LORazepam     Tablet 2 milliGRAM(s) Oral every 6 hours PRN Anxiety  LORazepam   Injectable 2 milliGRAM(s) IntraMuscular Once PRN Agitation  magnesium hydroxide Suspension 30 milliLiter(s) Oral daily PRN Constipation  QUEtiapine 50 milliGRAM(s) Oral at bedtime PRN insomnia  
MEDICATIONS  (STANDING):  divalproex ER 1500 milliGRAM(s) Oral at bedtime  influenza   Vaccine 0.5 milliLiter(s) IntraMuscular once  lithium CR (ESKALITH-CR) 900 milliGRAM(s) Oral at bedtime  OLANZapine 7.5 milliGRAM(s) Oral at bedtime  risperiDONE   Tablet 3 milliGRAM(s) Oral at bedtime    MEDICATIONS  (PRN):  acetaminophen     Tablet .. 650 milliGRAM(s) Oral every 6 hours PRN Temp greater or equal to 38C (100.4F), Mild Pain (1 - 3)  aluminum hydroxide/magnesium hydroxide/simethicone Suspension 30 milliLiter(s) Oral every 6 hours PRN Dyspepsia  diphenhydrAMINE 50 milliGRAM(s) Oral every 6 hours PRN Extrapyramidal symptoms or prophylaxis  diphenhydrAMINE Injectable 50 milliGRAM(s) IntraMuscular Once PRN agitation  haloperidol     Tablet 5 milliGRAM(s) Oral every 6 hours PRN agitation  haloperidol    Injectable 5 milliGRAM(s) IntraMuscular Once PRN agitation  LORazepam     Tablet 2 milliGRAM(s) Oral every 6 hours PRN Anxiety  LORazepam   Injectable 2 milliGRAM(s) IntraMuscular Once PRN Agitation  magnesium hydroxide Suspension 30 milliLiter(s) Oral daily PRN Constipation  QUEtiapine 50 milliGRAM(s) Oral at bedtime PRN insomnia  
MEDICATIONS  (STANDING):  influenza   Vaccine 0.5 milliLiter(s) IntraMuscular once    MEDICATIONS  (PRN):  acetaminophen     Tablet .. 650 milliGRAM(s) Oral every 6 hours PRN Temp greater or equal to 38C (100.4F), Mild Pain (1 - 3)  aluminum hydroxide/magnesium hydroxide/simethicone Suspension 30 milliLiter(s) Oral every 6 hours PRN Dyspepsia  diphenhydrAMINE 50 milliGRAM(s) Oral every 6 hours PRN Extrapyramidal symptoms or prophylaxis  diphenhydrAMINE Injectable 50 milliGRAM(s) IntraMuscular Once PRN agitation  haloperidol     Tablet 5 milliGRAM(s) Oral every 6 hours PRN agitation  haloperidol    Injectable 5 milliGRAM(s) IntraMuscular Once PRN agitation  LORazepam     Tablet 2 milliGRAM(s) Oral every 6 hours PRN Anxiety  LORazepam   Injectable 2 milliGRAM(s) IntraMuscular Once PRN Agitation  magnesium hydroxide Suspension 30 milliLiter(s) Oral daily PRN Constipation  QUEtiapine 50 milliGRAM(s) Oral at bedtime PRN insomnia  
MEDICATIONS  (STANDING):  divalproex ER 1500 milliGRAM(s) Oral at bedtime  influenza   Vaccine 0.5 milliLiter(s) IntraMuscular once  lithium CR (ESKALITH-CR) 900 milliGRAM(s) Oral at bedtime  risperiDONE   Tablet 3 milliGRAM(s) Oral at bedtime    MEDICATIONS  (PRN):  acetaminophen     Tablet .. 650 milliGRAM(s) Oral every 6 hours PRN Temp greater or equal to 38C (100.4F), Mild Pain (1 - 3)  aluminum hydroxide/magnesium hydroxide/simethicone Suspension 30 milliLiter(s) Oral every 6 hours PRN Dyspepsia  diphenhydrAMINE 50 milliGRAM(s) Oral every 6 hours PRN Extrapyramidal symptoms or prophylaxis  diphenhydrAMINE Injectable 50 milliGRAM(s) IntraMuscular Once PRN agitation  haloperidol     Tablet 5 milliGRAM(s) Oral every 6 hours PRN agitation  haloperidol    Injectable 5 milliGRAM(s) IntraMuscular Once PRN agitation  LORazepam     Tablet 2 milliGRAM(s) Oral every 6 hours PRN Anxiety  LORazepam   Injectable 2 milliGRAM(s) IntraMuscular Once PRN Agitation  magnesium hydroxide Suspension 30 milliLiter(s) Oral daily PRN Constipation  QUEtiapine 50 milliGRAM(s) Oral at bedtime PRN insomnia  
MEDICATIONS  (STANDING):  divalproex ER 1500 milliGRAM(s) Oral at bedtime  influenza   Vaccine 0.5 milliLiter(s) IntraMuscular once  lithium CR (ESKALITH-CR) 900 milliGRAM(s) Oral at bedtime  risperiDONE   Tablet 3 milliGRAM(s) Oral at bedtime    MEDICATIONS  (PRN):  acetaminophen     Tablet .. 650 milliGRAM(s) Oral every 6 hours PRN Temp greater or equal to 38C (100.4F), Mild Pain (1 - 3)  aluminum hydroxide/magnesium hydroxide/simethicone Suspension 30 milliLiter(s) Oral every 6 hours PRN Dyspepsia  diphenhydrAMINE 50 milliGRAM(s) Oral every 6 hours PRN Extrapyramidal symptoms or prophylaxis  diphenhydrAMINE Injectable 50 milliGRAM(s) IntraMuscular Once PRN agitation  haloperidol     Tablet 5 milliGRAM(s) Oral every 6 hours PRN agitation  haloperidol    Injectable 5 milliGRAM(s) IntraMuscular Once PRN agitation  LORazepam     Tablet 2 milliGRAM(s) Oral every 6 hours PRN Anxiety  LORazepam   Injectable 2 milliGRAM(s) IntraMuscular Once PRN Agitation  magnesium hydroxide Suspension 30 milliLiter(s) Oral daily PRN Constipation  QUEtiapine 50 milliGRAM(s) Oral at bedtime PRN insomnia  
MEDICATIONS  (STANDING):  divalproex ER 1500 milliGRAM(s) Oral at bedtime  influenza   Vaccine 0.5 milliLiter(s) IntraMuscular once  lithium CR (ESKALITH-CR) 900 milliGRAM(s) Oral at bedtime  OLANZapine 15 milliGRAM(s) Oral at bedtime  risperiDONE   Tablet 2 milliGRAM(s) Oral at bedtime    MEDICATIONS  (PRN):  acetaminophen     Tablet .. 650 milliGRAM(s) Oral every 6 hours PRN Temp greater or equal to 38C (100.4F), Mild Pain (1 - 3)  aluminum hydroxide/magnesium hydroxide/simethicone Suspension 30 milliLiter(s) Oral every 6 hours PRN Dyspepsia  diphenhydrAMINE 50 milliGRAM(s) Oral every 6 hours PRN Extrapyramidal symptoms or prophylaxis  diphenhydrAMINE Injectable 50 milliGRAM(s) IntraMuscular Once PRN agitation  haloperidol     Tablet 5 milliGRAM(s) Oral every 6 hours PRN agitation  haloperidol    Injectable 5 milliGRAM(s) IntraMuscular Once PRN agitation  LORazepam     Tablet 2 milliGRAM(s) Oral every 6 hours PRN Anxiety  LORazepam   Injectable 2 milliGRAM(s) IntraMuscular Once PRN Agitation  magnesium hydroxide Suspension 30 milliLiter(s) Oral daily PRN Constipation  QUEtiapine 50 milliGRAM(s) Oral at bedtime PRN insomnia  
MEDICATIONS  (STANDING):  divalproex ER 1500 milliGRAM(s) Oral at bedtime  influenza   Vaccine 0.5 milliLiter(s) IntraMuscular once  lithium CR (ESKALITH-CR) 900 milliGRAM(s) Oral at bedtime  OLANZapine 20 milliGRAM(s) Oral at bedtime    MEDICATIONS  (PRN):  acetaminophen     Tablet .. 650 milliGRAM(s) Oral every 6 hours PRN Temp greater or equal to 38C (100.4F), Mild Pain (1 - 3)  aluminum hydroxide/magnesium hydroxide/simethicone Suspension 30 milliLiter(s) Oral every 6 hours PRN Dyspepsia  diphenhydrAMINE 50 milliGRAM(s) Oral every 6 hours PRN Extrapyramidal symptoms or prophylaxis  diphenhydrAMINE Injectable 50 milliGRAM(s) IntraMuscular Once PRN agitation  haloperidol     Tablet 5 milliGRAM(s) Oral every 6 hours PRN agitation  haloperidol    Injectable 5 milliGRAM(s) IntraMuscular Once PRN agitation  LORazepam     Tablet 2 milliGRAM(s) Oral every 6 hours PRN Anxiety  LORazepam   Injectable 2 milliGRAM(s) IntraMuscular Once PRN Agitation  magnesium hydroxide Suspension 30 milliLiter(s) Oral daily PRN Constipation  QUEtiapine 50 milliGRAM(s) Oral at bedtime PRN insomnia

## 2024-02-22 NOTE — BH INPATIENT PSYCHIATRY PROGRESS NOTE - NSICDXBHPRIMARYDX_PSY_ALL_CORE
Schizoaffective disorder   F25.9  

## 2024-02-22 NOTE — BH INPATIENT PSYCHIATRY PROGRESS NOTE - NSTXCOPEPROGRES_PSY_ALL_CORE
No Change
Met - goal discontinued

## 2024-02-22 NOTE — BH INPATIENT PSYCHIATRY PROGRESS NOTE - NSTXDEPRESDATETRGT_PSY_ALL_CORE
14-Feb-2024
11-Feb-2024
14-Feb-2024
11-Feb-2024
14-Feb-2024
14-Feb-2024
23-Feb-2024
14-Feb-2024
11-Feb-2024
14-Feb-2024

## 2024-02-22 NOTE — BH INPATIENT PSYCHIATRY PROGRESS NOTE - NSBHASSESSSUMMFT_PSY_ALL_CORE
29yo M, single, unemployed, noncaregiver, domiciled with step-father/sister/uncle, with PPHx of documented Schizoaffective, bipolar type, at least 6 past hospitalizations last at [facility] 2 days ago, no known hx of suicidality or violence, no substance use, no relevant PMHx who presents to the ED after recent discharge from [facility] for worsening anxiety and SI.    on assessment, patient reported improved mood, no SI/I/P, reports mood to be euthymic, in agreement w/ OLIVIA, received loading dose #1 on 2/16, 2nd loading dose of Sustenna ordered for tomorrow 2/22, labs in AM and dc planning, including outpatient follow up.     No 1:1 needed as the patient is calm, pleasant, happy to be getting help.  No active SI on the unit.  stop  Zyprexa 7.5 mg hs  Restarted Lithium 900mg hs (was on 600mg bid - but level high on admission), lithium level at 0.8 on 2/14  Restarted VPA as ER 1500mg hs (was on 1000mg bid), vpa level at 69 on 2/14  c/w  Risperdal 3 mg hs, Invega sustenna 234mg IM given on 2/16, 2nd loading dose given on 2/22 156mg   CBC, CMP, VPA level, Lithium level, EKG in AM on 2/22  Obtain collateral from outpatient provider  Encourage unit participation  Disposition: [facility] pursuing discharge planning, possible for Friday 2/23 27yo M, single, unemployed, noncaregiver, domiciled with step-father/sister/uncle, with PPHx of documented Schizoaffective, bipolar type, at least 6 past hospitalizations last at [facility] 2 days ago, no known hx of suicidality or violence, no substance use, no relevant PMHx who presents to the ED after recent discharge from [facility] for worsening anxiety and SI.    on assessment, patient reported improved mood, no SI/I/P, reports mood to be euthymic, in agreement w/ OLIVIA, received loading dose #1 on 2/16, 2nd loading dose of Sustenna given today 2/22, labs in AM and dc planning, including outpatient follow up.     No 1:1 needed as the patient is calm, pleasant, happy to be getting help.  No active SI on the unit.  stop  Zyprexa 7.5 mg hs  Restarted Lithium 900mg hs (was on 600mg bid - but level high on admission), lithium level at 0.8 on 2/14  Restarted VPA as ER 1500mg hs (was on 1000mg bid), vpa level at 69 on 2/14  Decrease Risperdal to 2 mg hs, Invega sustenna 234mg IM given on 2/16, 2nd loading dose given on 2/22 156mg   CBC, CMP, VPA level, Lithium level, EKG in AM on 2/22 - results reviewed  Obtain collateral from outpatient provider  Encourage unit participation  Disposition: SW pursuing discharge planning, anticipated for tomorrow Friday 2/23

## 2024-02-23 ENCOUNTER — TRANSCRIPTION ENCOUNTER (OUTPATIENT)
Age: 29
End: 2024-02-23

## 2024-02-23 VITALS — TEMPERATURE: 97 F

## 2024-02-23 PROCEDURE — 99238 HOSP IP/OBS DSCHRG MGMT 30/<: CPT

## 2024-02-23 NOTE — BH INPATIENT PSYCHIATRY DISCHARGE NOTE - NSBHFUPINTERVALHXFT_PSY_A_CORE
Pt is seen and evaluated for f/up for SAD, chart is reviewed and care discussed w/ tx team, VSS. no overnight issues. agreed to OLIVIA--received invega sustenna 234mg IM on 2/16.  Patient is more visible and less isolative.  Pt is now discharge focused, reported feeling better, denied racing thoughts, paranoia or suicidal ideation.  Tolerating medications with no side effects, no td, eps or tremors observed/ reported. denied dizziness.  No agitation and in good behavioral control.  In no apparent distress, no somatic complaints.  Discharge planning being pursued and d/w patient and SW.  Labwork ordered for tomorrow AM and will seek coordination with family and outpatient psychiatric team.  Scheduled for 2nd loading dose of OLIVIA Invega Sustenna 156mg tomorrow on 2/22. Pt is seen and evaluated for f/up for SAD, chart is reviewed and care discussed w/ tx team, VSS. no overnight issues. agreed to OLIVIA--received invega sustenna 234mg IM first loading dose on 2/16 and 2nd dose of 156mg yesterday om 2/22.  Patient is more visible and less isolative.  Pt is optimistic and discharge focused, reported feeling better, denied racing thoughts, paranoia or suicidal ideation.  Tolerating medications with no side effects, no td, eps or tremors observed/ reported. denied dizziness.  No agitation and in good behavioral control.  In no apparent distress, no somatic complaints.  Discharge plan in place for today and d/w patient and SW.  Lab work reviewed from yesterday reveals Li and VPA levels at therapeutic levels.  In no apparent distress, no somatic complaints.  Pt denies SI/HI, no SIB, no intent/plan, no AH/VH/TH, no paranoia or delusions.

## 2024-02-23 NOTE — BH INPATIENT PSYCHIATRY DISCHARGE NOTE - NSDCMRMEDTOKEN_GEN_ALL_CORE_FT
divalproex sodium 500 mg oral tablet, extended release: 3 tab(s) orally once a day (at bedtime)  lithium 450 mg oral tablet, extended release: 2 tab(s) orally once a day (at bedtime)  paliperidone: 1 dose(s) intramuscularly every 4 weeks due for invega sustenna 117mg maintenance dose on 3/21, received second loading dose of invega sustenna on 2/22/24  risperiDONE 2 mg oral tablet: 1 tab(s) orally once a day (at bedtime)

## 2024-02-23 NOTE — BH INPATIENT PSYCHIATRY DISCHARGE NOTE - MSE UNSTRUCTURED FT
The patient appears stated age, with fair hygiene, and is dressed appropriately.    He was calm and superficially engaged with the interview.  He maintained appropriate eye contact.  No restlessness or slowing of movements observed.  Gait is steady.  The patient’s speech was fluent, normal in tone, increased in rate, and normal in volume.  The patient’s mood is “good”  His affect is neutral, appropriate.  The patient’s thoughts are more goal directed.  He does not have any clear delusions or hallucinations.  He denies any suicidal or homicidal ideation, intent, or plan.  Insight is partial.  Judgment is fair.  Impulse control has been fair on the unit.

## 2024-02-23 NOTE — BH INPATIENT PSYCHIATRY DISCHARGE NOTE - HPI (INCLUDE ILLNESS QUALITY, SEVERITY, DURATION, TIMING, CONTEXT, MODIFYING FACTORS, ASSOCIATED SIGNS AND SYMPTOMS)
As per [facility] ED Assessment:  "Patient is a 27yo M, single, unemployed, noncaregiver, domiciled with step-father/sister/uncle, with PPHx of documented Schizoaffective, bipolar type, at least 6 past hospitalizations last at [facility] dc 2 days ago, no known hx of suicidality or violence, no substance use, no relevant PMHx who presents to the ED after recent discharge from [facility] ED for anxiety.     On evaluation, patient is AOx3, tangential, with psychomotor agitation and choosing to stand intermittently. Fast speech but not pressured and can be interrupted. Require redirection at times.     Pt states that he was sent in [facility] to hospital by friend, Maria Del Carmen, who lives in [place]. Pt reports recent dc from [facility] two days ago. Since dc has been feeling anxious with low mood. Pt endorses not feeling safe at home and reports negative thoughts. Denies SI but states if he did he would die by hanging. Pt has not made any preparatory acts. Denies NSSIB. Denies hx of or current violence/aggression. Denies HI/I/P.  States that he has not been sleeping well. Pt is religiously preoccupied. Of note, similar to presentation in 10/2023, states that he went to [facility] ED last night attempting to be admitted but was discharged then brought himself to [facility]. Pt denies paranoia, avh, IOR. Reports dc from [facility] on Depakote and Lithium BID, reports adherence with last dose this morning. Denies substance. No hx of violence/aggression. No legal hx.     See  chart note for collateral."    On the unit, patient was calmer and speech was normal rate and rhythm. He was given PO PRN Ativan. Patient stated that he felt tired and wanted to lay down. Patient stated that he feels "much better", no longer endorsing SI. He denied any racing thoughts or any mood sx. Denies any AVH or HI. Denies any substance use. No CO needed at this time.  As per [facility] ED Assessment:  "Patient is a 27yo M, single, unemployed, noncaregiver, domiciled with step-father/sister/uncle, with PPHx of documented Schizoaffective, bipolar type, at least 6 past hospitalizations last at [facility] 2 days ago, no known hx of suicidality or violence, no substance use, no relevant PMHx who presents to the ED after recent discharge from [facility] ED for anxiety.     On evaluation, patient is AOx3, tangential, with psychomotor agitation and choosing to stand intermittently. Fast speech but not pressured and can be interrupted. Require redirection at times.     Pt states that he was sent in [facility] to hospital by friend, Maria Del Carmen, who lives in [place]. Pt reports recent dc from [facility] two days ago. Since dc has been feeling anxious with low mood. Pt endorses not feeling safe at home and reports negative thoughts. Denies SI but states if he did he would die by hanging. Pt has not made any preparatory acts. Denies NSSIB. Denies hx of or current violence/aggression. Denies HI/I/P.  States that he has not been sleeping well. Pt is religiously preoccupied. Of note, similar to presentation in 10/2023, states that he went to [facility] ED last night attempting to be admitted but was discharged then brought himself to [facility]. Pt denies paranoia, avh, IOR. Reports dc from [facility] on Depakote and Lithium BID, reports adherence with last dose this morning. Denies substance. No hx of violence/aggression. No legal hx.     See  chart note for collateral."    On the unit, patient was calmer and speech was normal rate and rhythm. He was given PO PRN Ativan. Patient stated that he felt tired and wanted to lay down. Patient stated that he feels "much better", no longer endorsing SI. He denied any racing thoughts or any mood sx. Denies any AVH or HI. Denies any substance use. No CO needed at this time.

## 2024-02-23 NOTE — BH INPATIENT PSYCHIATRY DISCHARGE NOTE - NSBHASSESSSUMMFT_PSY_ALL_CORE
27yo M, single, unemployed, noncaregiver, domiciled with step-father/sister/uncle, with PPHx of documented Schizoaffective, bipolar type, at least 6 past hospitalizations last at [facility] 2 days ago, no known hx of suicidality or violence, no substance use, no relevant PMHx who presents to the ED after recent discharge from [facility] for worsening anxiety and SI.    on assessment, patient reported improved mood, no SI/I/P, reports mood to be euthymic, in agreement w/ OLIVIA, received loading dose #1 on 2/16, 2nd loading dose of Sustenna ordered for tomorrow 2/22, labs in AM and dc planning, including outpatient follow up.     No 1:1 needed as the patient is calm, pleasant, happy to be getting help.  No active SI on the unit.  stop  Zyprexa 7.5 mg hs  Restarted Lithium 900mg hs (was on 600mg bid - but level high on admission), lithium level at 0.8 on 2/14  Restarted VPA as ER 1500mg hs (was on 1000mg bid), vpa level at 69 on 2/14  c/w  Risperdal 3 mg hs, Invega sustenna 234mg IM given on 2/16, 2nd loading dose given on 2/22 156mg   CBC, CMP, VPA level, Lithium level, EKG in AM on 2/22  Obtain collateral from outpatient provider  Encourage unit participation  Disposition: [facility] pursuing discharge planning, possible for Friday 2/23 29yo M, single, unemployed, noncaregiver, domiciled with step-father/sister/uncle, with PPHx of documented Schizoaffective, bipolar type, at least 6 past hospitalizations last at [facility] 2 days ago, no known hx of suicidality or violence, no substance use, no relevant PMHx who presents to the ED after recent discharge from [facility] for worsening anxiety and SI.    on assessment, patient reported improved mood, no SI/I/P, reports mood to be euthymic, in agreement w/ OLIVIA, received loading dose #1 on 2/16, 2nd loading dose of Sustenna ordered for tomorrow 2/22, labs in AM and dc planning, including outpatient follow up.     No 1:1 needed as the patient is calm, pleasant, happy to be getting help.  No active SI on the unit.  stop  Zyprexa 7.5 mg hs  Restarted Lithium 900mg hs (was on 600mg bid - but level high on admission), lithium level at 0.8 on 2/14  Restarted VPA as ER 1500mg hs (was on 1000mg bid), vpa level at 69 on 2/14  c/w  Risperdal 3 mg hs, Invega sustenna 234mg IM given on 2/16, 2nd loading dose given on 2/22 156mg   CBC, CMP, VPA level, Lithium level, EKG in AM on 2/22  Obtain collateral from outpatient provider  Encourage unit participation  Disposition: SW pursuing discharge planning, possible for Friday 2/23 29yo M, single, unemployed, non-caregiver, domiciled with step-father/sister/uncle, with PPHx of documented Schizoaffective, bipolar type, at least 6 past hospitalizations last at [facility] 2 days ago, no known hx of suicidality or violence, no substance use, no relevant PMHx who presents to the ED after recent discharge from [facility] ED for worsening anxiety and SI.    on assessment, patient reports sustained improved mood, no SI/I/P, reports mood to be euthymic, in agreement continuing with treatment w/ OLIVIA as outpatient, received loading dose #1 on 2/16, 2nd loading dose of Sustenna on 2/22, labs wnl as reviewed on 2/22 and dc planning, including outpatient follow up in place.  Pt is discharged in psychiatrically and medically stable condition.  No acute risk to self or others at this time.     Has been maintained on routine checks, No 1:1 needed has been needed as the patient is calm, pleasant.  No active/passive SI, no intent or plan.  Zyprexa 7.5 mg hs was discontinued  Restarted Lithium 900mg hs (was on 600mg bid - but level high on admission), lithium level at 1.0 on 2/22  Restarted VPA as ER 1500mg hs (was on 1000mg bid), vpa level at 70 on 2/22  c/w  Risperdal 2 mg hs (tapered down on 2/22), Invega Sustenna 234mg IM given on 2/16, 2nd loading dose given on 2/22 156mg   CBC, CMP, VPA level, Lithium level, EKG in AM on 2/22 - reviewed and wnl  Disposition: SW pursuing discharge planning, discharge to home today Friday 2/23

## 2024-02-23 NOTE — BH INPATIENT PSYCHIATRY DISCHARGE NOTE - HOSPITAL COURSE
The patient has continued to show improvement in symptoms.  He states his depression is much improved, and he denies any anxiety.  He denies any SI, and his behavior has been well controlled.  The patient has been sleeping better, without nightmares.  The patient has been fully engaged in treatment on the unit, compliant with medications, and is looking forward to continuing care as an outpatient.  The patient has support from his family, who are also protective factors for him.  He was able to safety plan appropriately.  The patient’s risk cannot be further decreased with continued inpatient hospitalization.  He is no longer an acute danger to himself or others, and is stable for discharge home.     The patient has continued to show improvement in symptoms.  He states his depression is much improved, and he denies any anxiety.  He denies any SI, and his behavior has been well controlled.  The patient has been sleeping better, without nightmares.  The patient has been fully engaged in treatment on the unit, compliant with medications, and is looking forward to continuing care as an outpatient.  The patient has support from his family, who are also protective factors for him.  He was able to safety plan appropriately.  The patient’s risk cannot be further decreased with continued inpatient hospitalization.  He is no longer an acute danger to himself or others, and is stable for discharge home.     Aftercare Appointment  Dr Ingram  28-Feb-2024 09:30  [facility] [address]   [facility]  [phone]  Mental Health Treatment  Please follow up with Arlette Mishra [phone] day program   27yo M, single, unemployed, non-caregiver, domiciled with step-father/sister/uncle, with PPHx of documented Schizoaffective, bipolar type, at least 6 past hospitalizations last at [facility] 2 days ago, no known hx of suicidality or violence, no substance use, no relevant PMHx who presents to the ED after recent discharge from [facility] ED for worsening anxiety and SI.  Pt was admitted to [facility] inpatient unit on a 9.13 voluntary legal status.  Initial levels of Lithium and Depakote were high and dosing, timing of meds were adjusted.  Patient was at first restarted on Zyprexa but was transitioned to Risperdal and ultimately Invega Sustenna OLIVIA with benefit.  Meds have been well tolerated.      The patient has continued to show improvement in symptoms.  He states his depression is much improved, and he denies any anxiety.  He denies any SI, and his behavior has been well controlled.  The patient has been sleeping better, without nightmares.  The patient has been fully engaged in treatment on the unit, compliant with medications, and is looking forward to continuing care as an outpatient.  The patient has support from his family, who are also protective factors for him.  He was able to safety plan appropriately.  The patient’s risk cannot be further decreased with continued inpatient hospitalization.  He is no longer an acute danger to himself or others, and is stable for discharge home.     Discharge meds:  - Lithium 900mg hs (was on 600mg bid - but level high on admission), lithium level at 1.0 on 2/22  - Depakote ER 1500mg hs (was on 1000mg bid), vpa level at 70 on 2/22  - Risperdal 2 mg hs (tapered down on 2/22),   - Invega Sustenna 117mg IM q 4 weeks due on (234mg IM given on 2/16, 2nd loading dose given on 2/22 156mg)     Aftercare Appointment  Dr Ingram  28-Feb-2024 09:30  [address]   [facility]  [phone]  Mental Health Treatment  Please follow up with Arlette Mishra [phone] day program   27yo M, single, unemployed, non-caregiver, domiciled with step-father/sister/uncle, with PPHx of documented Schizoaffective, bipolar type, at least 6 past hospitalizations last at [facility] 2 days ago, no known hx of suicidality or violence, no substance use, no relevant PMHx who presents to the ED after recent discharge from [facility] ED for worsening anxiety and SI.  Pt was admitted to [facility] inpatient unit on a 9.13 voluntary legal status.  Initial levels of Lithium and Depakote were high and dosing, timing of meds were adjusted.  Patient was at first restarted on Zyprexa but was transitioned to Risperdal and ultimately Invega Sustenna OLIVIA with benefit.  Meds have been well tolerated.      The patient has continued to show improvement in symptoms.  He states his depression is much improved, and he denies any anxiety.  He denies any SI, and his behavior has been well controlled.  The patient has been sleeping better, without nightmares.  The patient has been fully engaged in treatment on the unit, compliant with medications, and is looking forward to continuing care as an outpatient.  The patient has support from his family, who are also protective factors for him.  He was able to safety plan appropriately.  The patient’s risk cannot be further decreased with continued inpatient hospitalization.  He is no longer an acute danger to himself or others, and is stable for discharge home.     Discharge meds:  - Lithium CR 900mg hs (was on 600mg bid - but level high on admission), lithium level at 1.0 on 2/22  - Depakote ER 1500mg hs (was on 1000mg bid), vpa level at 70 on 2/22  - Risperdal 2 mg hs (tapered down on 2/22),   - Invega Sustenna 117mg IM q 4 weeks due on 3/21/24 (234mg IM given on 2/16, 2nd loading dose given on 2/22 156mg)     Aftercare Appointment  Dr Ingram  28-Feb-2024 09:30  [address]   [facility]  [phone]  Mental Health Treatment  Please follow up with Arlette Mishra [phone] day program

## 2024-02-23 NOTE — BH INPATIENT PSYCHIATRY DISCHARGE NOTE - OTHER PAST PSYCHIATRIC HISTORY (INCLUDE DETAILS REGARDING ONSET, COURSE OF ILLNESS, INPATIENT/OUTPATIENT TREATMENT)
Patient is a 28 year old male who lives with extended family, father, Charlie Kay, [phone]. The patient has multiple previous inpatient psychiatric hospitalizations, first at [facility] in 2014 and most recent at [facility], discharged on 2/2/24. He carries a diagnosis of Schizoaffective Disorder, Bipolar Type. Patient was compliant with medications after being discharged from [facility]. He continued to feel anxious with poor mood. He continued to have negative thoughts and felt unsafe at home. His friend sent him an Uber and he came to [facility] ED after going to [facility] and being discharged from the ED. He reported he was not having suicidal thoughts, but that if he did he would hang himself. Patient had fast speech, poor sleep, was agitated, standing up on occasion during the interview, and was religiously preoccupied. The patient has no reported history of substance abuse, aggression, trauma, or legal issues. Writer was unable to find reference to his outpatient providers. He has [employer].

## 2024-03-19 ENCOUNTER — TRANSCRIPTION ENCOUNTER (OUTPATIENT)
Age: 29
End: 2024-03-19

## 2024-03-20 ENCOUNTER — TRANSCRIPTION ENCOUNTER (OUTPATIENT)
Age: 29
End: 2024-03-20

## 2024-09-04 NOTE — ED BEHAVIORAL HEALTH ASSESSMENT NOTE - ACCESS TO FIREARM
-- DO NOT REPLY / DO NOT REPLY ALL --  -- This inbox is not monitored. If this was sent to the wrong provider or department, reroute message to P ECO Reroute pool. --  -- Message is from Engagement Center Operations (ECO) --    Offered Waitlist if Available for the Visit Type? No    Caller is requesting an appointment.    Reason for Appointment Message:  Priority rescheduling request    Reason for Visit: ob check , needs soon , nothing available , is supposed to be weekly     Is the patient currently scheduled? No    Preferred time to be seen: after 11 am     Caller Information       Contact Date/Time Type Contact Phone/Fax    09/04/2024 02:36 PM CDT Phone (Incoming) Ramona Joseph (Self) [phone] (M)            Alternative phone number: N    Can a detailed message be left?  Yes - Voicemail   Patient has been advised the message will be addressed within 2-3 business days          No

## 2024-11-26 NOTE — ED ADULT NURSE NOTE - NS_BH TRG QUESTION1_ED_ALL_ED
MRN# [id number]
MRN: [id number] [facility]/BILLY
Please print prescan out of Media Tab and then complete and sign.   Once form is completed and signed, please fax to [phone].  Please direct any questions to [phone].  Thanks,  Forms Completion Team. 
The signed forms came to forms completion with black lines down the forms. Can you please wipe down your fax machine and then refax the signed forms to [phone].  Thank you
No

## 2024-12-10 NOTE — ED PROVIDER NOTE - PSYCHIATRIC PERCEPTION
To ensure that you are eligible to receive the vaccine, please answer these questions about your medical history:    Screening Criteria for Injectable Influenza Vaccine:    1. Do you have moderate to severe fever?  No    2. Have you had a serious reaction to a flu shot before?  No    3. Have you ever had Guillain [place] Syndrome within 6 weeks of a previous flu shot?  No    4. Do you have a serious allergy to eggs?  No    5. If you are answering for a child, is the child less than 6 months of age?  No      If you answered \"YES\" to any of the above, you may not be eligible to receive the vaccine.  
influenza immunization given.  Patient tolerated without incident. See immunization grid for documentation.      
normal
no
